# Patient Record
Sex: FEMALE | Race: OTHER | HISPANIC OR LATINO
[De-identification: names, ages, dates, MRNs, and addresses within clinical notes are randomized per-mention and may not be internally consistent; named-entity substitution may affect disease eponyms.]

---

## 2017-06-21 ENCOUNTER — APPOINTMENT (OUTPATIENT)
Dept: RADIOLOGY | Facility: CLINIC | Age: 22
End: 2017-06-21

## 2017-06-21 ENCOUNTER — OUTPATIENT (OUTPATIENT)
Dept: OUTPATIENT SERVICES | Facility: HOSPITAL | Age: 22
LOS: 1 days | End: 2017-06-21
Payer: COMMERCIAL

## 2017-06-21 DIAGNOSIS — Z00.8 ENCOUNTER FOR OTHER GENERAL EXAMINATION: ICD-10-CM

## 2017-06-21 PROCEDURE — 72070 X-RAY EXAM THORAC SPINE 2VWS: CPT

## 2017-06-21 PROCEDURE — 72040 X-RAY EXAM NECK SPINE 2-3 VW: CPT

## 2017-09-28 ENCOUNTER — RESULT REVIEW (OUTPATIENT)
Age: 22
End: 2017-09-28

## 2017-11-26 ENCOUNTER — EMERGENCY (EMERGENCY)
Facility: HOSPITAL | Age: 22
LOS: 0 days | Discharge: HOME | End: 2017-11-26
Attending: EMERGENCY MEDICINE | Admitting: EMERGENCY MEDICINE
Payer: COMMERCIAL

## 2017-11-26 DIAGNOSIS — S90.821A BLISTER (NONTHERMAL), RIGHT FOOT, INITIAL ENCOUNTER: ICD-10-CM
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
## 2017-11-26 DIAGNOSIS — S61.452A OPEN BITE OF LEFT HAND, INITIAL ENCOUNTER: ICD-10-CM
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
## 2017-11-26 PROCEDURE — 99283 EMERGENCY DEPT VISIT LOW MDM: CPT | Mod: 25

## 2017-11-26 RX ORDER — IBUPROFEN 200 MG
1 TABLET ORAL
Qty: 10 | Refills: 0
Start: 2017-11-26

## 2017-11-26 RX ORDER — IBUPROFEN 200 MG
600 TABLET ORAL ONCE
Qty: 0 | Refills: 0 | Status: COMPLETED | OUTPATIENT
Start: 2017-11-26 | End: 2017-11-26

## 2017-11-26 RX ADMIN — Medication 600 MILLIGRAM(S): at 02:45

## 2017-11-26 RX ADMIN — Medication 1 TABLET(S): at 02:45

## 2017-11-26 NOTE — ED PROVIDER NOTE - MEDICAL DECISION MAKING DETAILS
Wound care, antibiotics and outpatient wound check recommended for dog bite.  Wound dressing on right foot for her blister.

## 2017-11-26 NOTE — ED ADULT NURSE NOTE - CHIEF COMPLAINT QUOTE
dog bite by pts own black lab left forearm 30 minutes PTA. as per pt, dogs vaccinations utd. pt unsure of last tetanus

## 2017-11-26 NOTE — ED PROVIDER NOTE - CARE PLAN
Principal Discharge DX:	Dog bite, initial encounter Principal Discharge DX:	Dog bite, initial encounter  Secondary Diagnosis:	Blister of foot, unspecified laterality, initial encounter

## 2017-11-26 NOTE — ED PROVIDER NOTE - OBJECTIVE STATEMENT
Pt. is a 23 yo F BIB Pt. is a 23 yo F presenting with her sister after they were both bit by their black labrador retriever dog they recently adopted.  Pt. was trying to separate dog from biting her sister and then was bit herself on her hand.  She also complains of unrelated blister on top of her right foot for a week.  Pt. UTD on all immunization Pt. is a 21 yo F presenting with her sister after they were both bit by their black labrador retriever dog they recently adopted.  Pt. was trying to separate dog from biting her sister and then was bit herself on her hand.  She also complains of unrelated blister on top of her right foot for a week.  Pt. UTD on all immunizations.  Dog bite occurred approximately 1 hour prior to arrival.

## 2018-08-08 ENCOUNTER — RESULT REVIEW (OUTPATIENT)
Age: 23
End: 2018-08-08

## 2018-08-21 ENCOUNTER — OUTPATIENT (OUTPATIENT)
Dept: OUTPATIENT SERVICES | Facility: HOSPITAL | Age: 23
LOS: 1 days | End: 2018-08-21
Payer: MEDICAID

## 2018-08-21 ENCOUNTER — APPOINTMENT (OUTPATIENT)
Dept: ULTRASOUND IMAGING | Facility: CLINIC | Age: 23
End: 2018-08-21
Payer: MEDICAID

## 2018-08-21 DIAGNOSIS — Z00.8 ENCOUNTER FOR OTHER GENERAL EXAMINATION: ICD-10-CM

## 2018-08-21 PROCEDURE — 76856 US EXAM PELVIC COMPLETE: CPT | Mod: 26

## 2018-08-21 PROCEDURE — 76830 TRANSVAGINAL US NON-OB: CPT

## 2018-08-21 PROCEDURE — 76830 TRANSVAGINAL US NON-OB: CPT | Mod: 26

## 2018-08-21 PROCEDURE — 76856 US EXAM PELVIC COMPLETE: CPT

## 2018-08-23 ENCOUNTER — APPOINTMENT (OUTPATIENT)
Dept: RADIOLOGY | Facility: CLINIC | Age: 23
End: 2018-08-23
Payer: MEDICAID

## 2018-08-23 ENCOUNTER — OUTPATIENT (OUTPATIENT)
Dept: OUTPATIENT SERVICES | Facility: HOSPITAL | Age: 23
LOS: 1 days | End: 2018-08-23
Payer: MEDICAID

## 2018-08-23 DIAGNOSIS — Z00.8 ENCOUNTER FOR OTHER GENERAL EXAMINATION: ICD-10-CM

## 2018-08-23 PROCEDURE — 73630 X-RAY EXAM OF FOOT: CPT

## 2018-08-23 PROCEDURE — 73630 X-RAY EXAM OF FOOT: CPT | Mod: 26,50

## 2019-07-01 ENCOUNTER — EMERGENCY (EMERGENCY)
Facility: HOSPITAL | Age: 24
LOS: 0 days | Discharge: HOME | End: 2019-07-02
Attending: EMERGENCY MEDICINE | Admitting: EMERGENCY MEDICINE
Payer: SELF-PAY

## 2019-07-01 VITALS
TEMPERATURE: 99 F | SYSTOLIC BLOOD PRESSURE: 108 MMHG | OXYGEN SATURATION: 100 % | HEART RATE: 73 BPM | RESPIRATION RATE: 18 BRPM | WEIGHT: 143.96 LBS | DIASTOLIC BLOOD PRESSURE: 78 MMHG | HEIGHT: 64 IN
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
## 2019-07-01 DIAGNOSIS — M54.5 LOW BACK PAIN: ICD-10-CM

## 2019-07-01 DIAGNOSIS — S39.012A STRAIN OF MUSCLE, FASCIA AND TENDON OF LOWER BACK, INITIAL ENCOUNTER: ICD-10-CM
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
## 2019-07-01 PROCEDURE — 99284 EMERGENCY DEPT VISIT MOD MDM: CPT
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
## 2019-07-02 PROCEDURE — 72100 X-RAY EXAM L-S SPINE 2/3 VWS: CPT | Mod: 26

## 2019-07-02 RX ORDER — IBUPROFEN 200 MG
1 TABLET ORAL
Qty: 28 | Refills: 0
Start: 2019-07-02 | End: 2019-07-08

## 2019-07-02 RX ORDER — OXYCODONE AND ACETAMINOPHEN 5; 325 MG/1; MG/1
1 TABLET ORAL ONCE
Refills: 0 | Status: DISCONTINUED | OUTPATIENT
Start: 2019-07-02 | End: 2019-07-02

## 2019-07-02 RX ORDER — KETOROLAC TROMETHAMINE 30 MG/ML
60 SYRINGE (ML) INJECTION ONCE
Refills: 0 | Status: DISCONTINUED | OUTPATIENT
Start: 2019-07-02 | End: 2019-07-02

## 2019-07-02 RX ORDER — DIAZEPAM 5 MG
5 TABLET ORAL ONCE
Refills: 0 | Status: DISCONTINUED | OUTPATIENT
Start: 2019-07-02 | End: 2019-07-02

## 2019-07-02 RX ORDER — DIAZEPAM 5 MG
1 TABLET ORAL
Qty: 20 | Refills: 0
Start: 2019-07-02 | End: 2019-07-06

## 2019-07-02 RX ADMIN — OXYCODONE AND ACETAMINOPHEN 1 TABLET(S): 5; 325 TABLET ORAL at 01:08

## 2019-07-02 RX ADMIN — Medication 60 MILLIGRAM(S): at 00:37

## 2019-07-02 RX ADMIN — Medication 5 MILLIGRAM(S): at 01:08
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
## 2019-07-02 NOTE — ED PROVIDER NOTE - CARE PROVIDER_API CALL
Alexandro John)  Orthopaedic Surgery  [address] [place] [address]  Phone: [phone]  Fax: [phone]  Follow Up Time:

## 2019-07-02 NOTE — ED PROVIDER NOTE - MUSCULOSKELETAL, MLM
Spine appears normal, range of motion is not limited, right lower lumbar and iliac ttp no midline ttp ctls

## 2019-07-04 ENCOUNTER — EMERGENCY (EMERGENCY)
Facility: HOSPITAL | Age: 24
LOS: 0 days | Discharge: HOME | End: 2019-07-04
Attending: EMERGENCY MEDICINE | Admitting: EMERGENCY MEDICINE
Payer: SELF-PAY

## 2019-07-04 VITALS
HEIGHT: 64 IN | HEART RATE: 100 BPM | TEMPERATURE: 99 F | RESPIRATION RATE: 19 BRPM | SYSTOLIC BLOOD PRESSURE: 137 MMHG | DIASTOLIC BLOOD PRESSURE: 99 MMHG | WEIGHT: 143.08 LBS | OXYGEN SATURATION: 100 %

## 2019-07-04 VITALS — DIASTOLIC BLOOD PRESSURE: 64 MMHG | SYSTOLIC BLOOD PRESSURE: 112 MMHG

## 2019-07-04 DIAGNOSIS — R10.9 UNSPECIFIED ABDOMINAL PAIN: ICD-10-CM

## 2019-07-04 DIAGNOSIS — R10.13 EPIGASTRIC PAIN: ICD-10-CM

## 2019-07-04 LAB
ALBUMIN SERPL ELPH-MCNC: 4.1 G/DL — SIGNIFICANT CHANGE UP (ref 3.3–5)
ALP SERPL-CCNC: 64 U/L — SIGNIFICANT CHANGE UP (ref 40–120)
ALT FLD-CCNC: 15 U/L — SIGNIFICANT CHANGE UP (ref 12–78)
ANION GAP SERPL CALC-SCNC: 8 MMOL/L — SIGNIFICANT CHANGE UP (ref 5–17)
APPEARANCE UR: CLEAR — SIGNIFICANT CHANGE UP
AST SERPL-CCNC: 15 U/L — SIGNIFICANT CHANGE UP (ref 15–37)
BACTERIA # UR AUTO: NEGATIVE — SIGNIFICANT CHANGE UP
BASOPHILS # BLD AUTO: 0.04 K/UL — SIGNIFICANT CHANGE UP (ref 0–0.2)
BASOPHILS NFR BLD AUTO: 0.4 % — SIGNIFICANT CHANGE UP (ref 0–2)
BILIRUB SERPL-MCNC: 0.5 MG/DL — SIGNIFICANT CHANGE UP (ref 0.2–1.2)
BILIRUB UR-MCNC: NEGATIVE — SIGNIFICANT CHANGE UP
BUN SERPL-MCNC: 6 MG/DL — LOW (ref 7–23)
CALCIUM SERPL-MCNC: 9.4 MG/DL — SIGNIFICANT CHANGE UP (ref 8.5–10.1)
CHLORIDE SERPL-SCNC: 103 MMOL/L — SIGNIFICANT CHANGE UP (ref 96–108)
CO2 SERPL-SCNC: 27 MMOL/L — SIGNIFICANT CHANGE UP (ref 22–31)
COLOR SPEC: YELLOW — SIGNIFICANT CHANGE UP
COMMENT - URINE: SIGNIFICANT CHANGE UP
CREAT SERPL-MCNC: 0.64 MG/DL — SIGNIFICANT CHANGE UP (ref 0.5–1.3)
DIFF PNL FLD: ABNORMAL
EOSINOPHIL # BLD AUTO: 0.05 K/UL — SIGNIFICANT CHANGE UP (ref 0–0.5)
EOSINOPHIL NFR BLD AUTO: 0.5 % — SIGNIFICANT CHANGE UP (ref 0–6)
EPI CELLS # UR: SIGNIFICANT CHANGE UP
GLUCOSE SERPL-MCNC: 108 MG/DL — HIGH (ref 70–99)
GLUCOSE UR QL: NEGATIVE MG/DL — SIGNIFICANT CHANGE UP
HCT VFR BLD CALC: 38.6 % — SIGNIFICANT CHANGE UP (ref 34.5–45)
HGB BLD-MCNC: 13 G/DL — SIGNIFICANT CHANGE UP (ref 11.5–15.5)
IMM GRANULOCYTES NFR BLD AUTO: 0.2 % — SIGNIFICANT CHANGE UP (ref 0–1.5)
KETONES UR-MCNC: NEGATIVE — SIGNIFICANT CHANGE UP
LEUKOCYTE ESTERASE UR-ACNC: NEGATIVE — SIGNIFICANT CHANGE UP
LIDOCAIN IGE QN: 86 U/L — SIGNIFICANT CHANGE UP (ref 73–393)
LYMPHOCYTES # BLD AUTO: 3.07 K/UL — SIGNIFICANT CHANGE UP (ref 1–3.3)
LYMPHOCYTES # BLD AUTO: 33 % — SIGNIFICANT CHANGE UP (ref 13–44)
MCHC RBC-ENTMCNC: 28.5 PG — SIGNIFICANT CHANGE UP (ref 27–34)
MCHC RBC-ENTMCNC: 33.7 GM/DL — SIGNIFICANT CHANGE UP (ref 32–36)
MCV RBC AUTO: 84.6 FL — SIGNIFICANT CHANGE UP (ref 80–100)
MONOCYTES # BLD AUTO: 0.56 K/UL — SIGNIFICANT CHANGE UP (ref 0–0.9)
MONOCYTES NFR BLD AUTO: 6 % — SIGNIFICANT CHANGE UP (ref 2–14)
NEUTROPHILS # BLD AUTO: 5.56 K/UL — SIGNIFICANT CHANGE UP (ref 1.8–7.4)
NEUTROPHILS NFR BLD AUTO: 59.9 % — SIGNIFICANT CHANGE UP (ref 43–77)
NITRITE UR-MCNC: NEGATIVE — SIGNIFICANT CHANGE UP
PH UR: 7 — SIGNIFICANT CHANGE UP (ref 5–8)
PLATELET # BLD AUTO: 281 K/UL — SIGNIFICANT CHANGE UP (ref 150–400)
POTASSIUM SERPL-MCNC: 3.8 MMOL/L — SIGNIFICANT CHANGE UP (ref 3.5–5.3)
POTASSIUM SERPL-SCNC: 3.8 MMOL/L — SIGNIFICANT CHANGE UP (ref 3.5–5.3)
PROT SERPL-MCNC: 7.7 GM/DL — SIGNIFICANT CHANGE UP (ref 6–8.3)
PROT UR-MCNC: NEGATIVE MG/DL — SIGNIFICANT CHANGE UP
RBC # BLD: 4.56 M/UL — SIGNIFICANT CHANGE UP (ref 3.8–5.2)
RBC # FLD: 13.6 % — SIGNIFICANT CHANGE UP (ref 10.3–14.5)
RBC CASTS # UR COMP ASSIST: SIGNIFICANT CHANGE UP /HPF (ref 0–4)
SODIUM SERPL-SCNC: 138 MMOL/L — SIGNIFICANT CHANGE UP (ref 135–145)
SP GR SPEC: 1.01 — SIGNIFICANT CHANGE UP (ref 1.01–1.02)
UROBILINOGEN FLD QL: NEGATIVE MG/DL — SIGNIFICANT CHANGE UP
WBC # BLD: 9.3 K/UL — SIGNIFICANT CHANGE UP (ref 3.8–10.5)
WBC # FLD AUTO: 9.3 K/UL — SIGNIFICANT CHANGE UP (ref 3.8–10.5)
WBC UR QL: NEGATIVE — SIGNIFICANT CHANGE UP

## 2019-07-04 PROCEDURE — 74177 CT ABD & PELVIS W/CONTRAST: CPT | Mod: 26

## 2019-07-04 PROCEDURE — 93010 ELECTROCARDIOGRAM REPORT: CPT

## 2019-07-04 PROCEDURE — 99284 EMERGENCY DEPT VISIT MOD MDM: CPT

## 2019-07-04 PROCEDURE — 99053 MED SERV 10PM-8AM 24 HR FAC: CPT

## 2019-07-04 RX ORDER — FAMOTIDINE 10 MG/ML
20 INJECTION INTRAVENOUS ONCE
Refills: 0 | Status: COMPLETED | OUTPATIENT
Start: 2019-07-04 | End: 2019-07-04

## 2019-07-04 RX ORDER — ONDANSETRON 8 MG/1
4 TABLET, FILM COATED ORAL ONCE
Refills: 0 | Status: COMPLETED | OUTPATIENT
Start: 2019-07-04 | End: 2019-07-04

## 2019-07-04 RX ORDER — MULTIVIT WITH MIN/MFOLATE/K2 340-15/3 G
1 POWDER (GRAM) ORAL ONCE
Refills: 0 | Status: COMPLETED | OUTPATIENT
Start: 2019-07-04 | End: 2019-07-04

## 2019-07-04 RX ORDER — SODIUM CHLORIDE 9 MG/ML
1000 INJECTION INTRAMUSCULAR; INTRAVENOUS; SUBCUTANEOUS ONCE
Refills: 0 | Status: COMPLETED | OUTPATIENT
Start: 2019-07-04 | End: 2019-07-04

## 2019-07-04 RX ADMIN — FAMOTIDINE 20 MILLIGRAM(S): 10 INJECTION INTRAVENOUS at 04:22

## 2019-07-04 RX ADMIN — SODIUM CHLORIDE 1000 MILLILITER(S): 9 INJECTION INTRAMUSCULAR; INTRAVENOUS; SUBCUTANEOUS at 05:21

## 2019-07-04 RX ADMIN — SODIUM CHLORIDE 1000 MILLILITER(S): 9 INJECTION INTRAMUSCULAR; INTRAVENOUS; SUBCUTANEOUS at 04:21

## 2019-07-04 RX ADMIN — Medication 1 BOTTLE: at 05:56

## 2019-07-04 RX ADMIN — ONDANSETRON 4 MILLIGRAM(S): 8 TABLET, FILM COATED ORAL at 04:22

## 2019-07-04 NOTE — ED PROVIDER NOTE - NSFOLLOWUPINSTRUCTIONS_ED_ALL_ED_FT
Follow up with your doctor at the [facility] on Monday  Stay well hydrated  Return to ED for any worsening symptoms    Abdominal Pain    Many things can cause abdominal pain. Many times, abdominal pain is not caused by a disease and will improve without treatment. Your health care provider will do a physical exam to determine if there is a dangerous cause of your pain; blood tests and imaging may help determine the cause of your pain. However, in many cases, no cause may be found and you may need further testing as an outpatient. Monitor your abdominal pain for any changes.     SEEK IMMEDIATE MEDICAL CARE IF YOU HAVE ANY OF THE FOLLOWING SYMPTOMS: worsening abdominal pain, uncontrollable vomiting, profuse diarrhea, inability to have bowel movements or pass gas, black or bloody stools, fever accompanying chest pain or back pain, or fainting. These symptoms may represent a serious problem that is an emergency. Do not wait to see if the symptoms will go away. Get medical help right away. Call 911 and do not drive yourself to the hospital.       Constipation    Constipation is when a person has fewer than three bowel movements a week, has difficulty having a bowel movement, or has stools that are dry, hard, or larger than normal. Other symptoms can include abdominal pain or bloating. As people grow older, constipation is more common. A low-fiber diet, not taking in enough fluids, and taking certain medicines, including opioid painkillers, may make constipation worse. Treatment varies but may include dietary modifications (more fiber-rich foods), lifestyle modifications, and possible medications.     SEEK IMMEDIATE MEDICAL CARE IF YOU HAVE ANY OF THE FOLLOWING SYMPTOMS: bright red blood in your stool, constipation for longer than 4 days, abdominal or rectal pain, unexplained weight loss, or inability to pass gas.

## 2019-07-04 NOTE — ED ADULT TRIAGE NOTE - CHIEF COMPLAINT QUOTE
patient c/o worsening sharp constant chest pain and generalized abdominal pain for past day, unable to sleep from pain. nauseous. denies vomiting or diarrhea.

## 2019-07-04 NOTE — ED ADULT NURSE NOTE - NSIMPLEMENTINTERV_GEN_ALL_ED
Implemented All Fall Risk Interventions:  [place] to call system. Call bell, personal items and telephone within reach. Instruct patient to call for assistance. Room bathroom lighting operational. Non-slip footwear when patient is off stretcher. Physically safe environment: no spills, clutter or unnecessary equipment. Stretcher in lowest position, wheels locked, appropriate side rails in place. Provide visual cue, wrist band, yellow gown, etc. Monitor gait and stability. Monitor for mental status changes and reorient to person, place, and time. Review medications for side effects contributing to fall risk. Reinforce activity limits and safety measures with patient and family.
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
## 2019-10-06 ENCOUNTER — EMERGENCY (EMERGENCY)
Facility: HOSPITAL | Age: 24
LOS: 0 days | Discharge: HOME | End: 2019-10-06
Attending: EMERGENCY MEDICINE
Payer: SELF-PAY

## 2019-10-06 VITALS — HEIGHT: 64 IN | WEIGHT: 130.07 LBS

## 2019-10-06 VITALS
OXYGEN SATURATION: 100 % | DIASTOLIC BLOOD PRESSURE: 62 MMHG | SYSTOLIC BLOOD PRESSURE: 106 MMHG | RESPIRATION RATE: 18 BRPM | TEMPERATURE: 98 F | HEART RATE: 64 BPM

## 2019-10-06 DIAGNOSIS — L03.011 CELLULITIS OF RIGHT FINGER: ICD-10-CM

## 2019-10-06 DIAGNOSIS — M79.644 PAIN IN RIGHT FINGER(S): ICD-10-CM

## 2019-10-06 PROCEDURE — 99283 EMERGENCY DEPT VISIT LOW MDM: CPT | Mod: 25

## 2019-10-06 PROCEDURE — 26010 DRAINAGE OF FINGER ABSCESS: CPT

## 2019-10-06 PROCEDURE — 87070 CULTURE OTHR SPECIMN AEROBIC: CPT

## 2019-10-06 PROCEDURE — 81025 URINE PREGNANCY TEST: CPT

## 2019-10-06 PROCEDURE — 87186 SC STD MICRODIL/AGAR DIL: CPT

## 2019-10-06 PROCEDURE — 99283 EMERGENCY DEPT VISIT LOW MDM: CPT

## 2019-10-06 PROCEDURE — 26010 DRAINAGE OF FINGER ABSCESS: CPT | Mod: F7

## 2019-10-06 RX ORDER — IBUPROFEN 200 MG
600 TABLET ORAL ONCE
Refills: 0 | Status: COMPLETED | OUTPATIENT
Start: 2019-10-06 | End: 2019-10-06

## 2019-10-06 RX ORDER — AZTREONAM 2 G
1 VIAL (EA) INJECTION
Qty: 14 | Refills: 0
Start: 2019-10-06 | End: 2019-10-12

## 2019-10-06 RX ADMIN — Medication 100 MILLIGRAM(S): at 21:33

## 2019-10-06 RX ADMIN — Medication 600 MILLIGRAM(S): at 21:00

## 2019-10-06 NOTE — ED STATDOCS - OBJECTIVE STATEMENT
24 yr old F with no PMHx presents to ED with finger pain. +erythema patient reports that she started to have middle finger pain and swelling that has worsened over the past 2 days. Patient has no further complaints at this time.

## 2019-10-06 NOTE — ED STATDOCS - ATTENDING CONTRIBUTION TO CARE
Attending Contribution to Care: I, Sia Nuno, performed the initial face to face bedside interview with this patient regarding history of present illness, review of symptoms and relevant past medical, social and family history.  I completed an independent physical examination.  I was the initial provider who evaluated this patient and the history, physical, and MDM reflect this intial assessment. I have signed out the follow up of any pending tests after the original (i.e. labs, radiological studies) to the ACP with instructions to review any with instructions to review any concerning findings to me prior to discharge.  I have communicated the patient’s plan of care and disposition with the ACP.

## 2019-10-06 NOTE — ED STATDOCS - PROGRESS NOTE DETAILS
signed Aida Gallegos PA-C Pt seen initially in intake by Dr. Kaba.   24F c/o right 3rd digit fingertip swelling and pain x 3 days after she pulled off a piece of her cuticle. Denies fever. + paronychia right 3rd fingertip. Incised, small purulence express, cx sent, start abx doxy. return precautions given. Pt feeling well at DC, agrees with DC and plan of care.

## 2019-10-06 NOTE — ED POST DISCHARGE NOTE - RESULT SUMMARY
Pt called, she vomited after DC. Will change doxy to bactrim, start bactrim instead. Pt agrees with plan of  care. signed Aida Gallegos PA-C Pt called, she vomited after DC. Will change doxy to bactrim, start bactrim instead. Pt agrees with plan of care. signed Aida Gallegos PA-C

## 2019-10-06 NOTE — ED STATDOCS - SKIN, MLM
skin normal color for race, warm, dry and intact. +R 3rd digit swelling and any erythema with fluctuance, no drainage

## 2019-10-06 NOTE — ED ADULT NURSE NOTE - NSIMPLEMENTINTERV_GEN_ALL_ED
Implemented All Universal Safety Interventions:  [place] to call system. Call bell, personal items and telephone within reach. Instruct patient to call for assistance. Room bathroom lighting operational. Non-slip footwear when patient is off stretcher. Physically safe environment: no spills, clutter or unnecessary equipment. Stretcher in lowest position, wheels locked, appropriate side rails in place.

## 2019-10-06 NOTE — ED ADULT NURSE NOTE - OBJECTIVE STATEMENT
pt to ed for right hand finger pain. Redness and edema noted to finger. Complains of 9/10 pain. Denies numbness and tingling.

## 2019-10-06 NOTE — ED POST DISCHARGE NOTE - DETAILS
Culture + for Staph aureus, currently on bactrim to which bacteria is sensitive. - Damaso Tello PA-C

## 2019-10-06 NOTE — ED STATDOCS - PATIENT PORTAL LINK FT
You can access the FollowMyHealth Patient Portal offered by [facility] by registering at the following website: [email or web address]. By joining [facility]’s FollowMyHealth portal, you will also be able to view your health information using other applications (apps) compatible with our system.

## 2019-10-09 LAB
-  AMPICILLIN/SULBACTAM: SIGNIFICANT CHANGE UP
-  CEFAZOLIN: SIGNIFICANT CHANGE UP
-  CLINDAMYCIN: SIGNIFICANT CHANGE UP
-  ERYTHROMYCIN: SIGNIFICANT CHANGE UP
-  GENTAMICIN: SIGNIFICANT CHANGE UP
-  OXACILLIN: SIGNIFICANT CHANGE UP
-  RIFAMPIN: SIGNIFICANT CHANGE UP
-  TETRACYCLINE: SIGNIFICANT CHANGE UP
-  TRIMETHOPRIM/SULFAMETHOXAZOLE: SIGNIFICANT CHANGE UP
-  VANCOMYCIN: SIGNIFICANT CHANGE UP
CULTURE RESULTS: SIGNIFICANT CHANGE UP
METHOD TYPE: SIGNIFICANT CHANGE UP
ORGANISM # SPEC MICROSCOPIC CNT: SIGNIFICANT CHANGE UP
ORGANISM # SPEC MICROSCOPIC CNT: SIGNIFICANT CHANGE UP
SPECIMEN SOURCE: SIGNIFICANT CHANGE UP

## 2020-08-07 ENCOUNTER — TRANSCRIPTION ENCOUNTER (OUTPATIENT)
Age: 25
End: 2020-08-07

## 2020-08-14 ENCOUNTER — APPOINTMENT (OUTPATIENT)
Dept: ORTHOPEDIC SURGERY | Facility: CLINIC | Age: 25
End: 2020-08-14
Payer: MEDICAID

## 2020-08-14 VITALS
SYSTOLIC BLOOD PRESSURE: 113 MMHG | HEART RATE: 69 BPM | BODY MASS INDEX: 23.32 KG/M2 | WEIGHT: 140 LBS | HEIGHT: 65 IN | DIASTOLIC BLOOD PRESSURE: 73 MMHG

## 2020-08-14 PROCEDURE — 99204 OFFICE O/P NEW MOD 45 MIN: CPT

## 2020-08-14 NOTE — HISTORY OF PRESENT ILLNESS
[FreeTextEntry1] : 25 year old female presenting with left ankle pain. The patient’s pain is noted to be a 5/10. The patient's pain began 2.5 weeks ago when she injured her ankle while walking. The patient describes their pain as constant, throbbing, and sharp. The pain is made worse by walking. She presents in the office with an ankle brace. She is currently taking no pain medication. No other complaints at this time.

## 2020-08-14 NOTE — ADDENDUM
[FreeTextEntry1] : I, Myron Lawrence, acted solely as a scribe for Dr. Michael Caicedo on this date 08/14/2020 .\par All medical record entries made by the Scribe were at my, Dr. Michael Caicedo, direction and personally dictated by me on 08/14/2020 . I have reviewed the chart and agree that the record accurately reflects my personal performance of the history, physical exam, assessment and plan. I have also personally directed, reviewed, and agreed with the chart.

## 2020-08-14 NOTE — DISCUSSION/SUMMARY
[de-identified] : Today I had a lengthy discussion with the patient regarding their left ankle pain. I have addressed all the patient's concerns surrounding the pathology of their condition. I recommend the patient undergo a course of physical therapy for the left ankle 2-3 times a week for a total of 6-8 weeks. A prescription was given for the physical therapy today. I recommend that the patient utilize ice, NSAIDS PRN, and heat. She can also elevate the left ankle above the level of the heart. I would like to see the patient back in the office PRN to reassess their condition. The patient understood and verbally agreed to the treatment plan. All of their questions were answered and they were satisfied with the visit. The patient should call the office if they have any questions or experience worsening symptoms.

## 2020-08-14 NOTE — PHYSICAL EXAM
[de-identified] : General: Alert and oriented x3. In no acute distress. Pleasant in nature with a normal affect. No apparent respiratory distress.\par \par L Ankle Exam\par Skin: Clean, dry, intact\par Inspection: No obvious malalignment, no swelling, no effusion; no lymphadenopathy\par Pulses: 2+ DP/PT pulses\par ROM: L Ankle 10 degrees of dorsiflexion, 40 degrees of plantarflexion, 10 degrees of subtalar motion\par Tenderness: +tenderness anterolateral gutter, +ATFL. No tenderness over the lateral malleolus, no CFL//PTFL pain. No medial malleolus pain, no deltoid ligament pain. No proximal fibular pain. No heel pain.\par Stability: Negative anterior/posterior drawer.\par Strength: 5/5 TA/GS/EHL\par Neuro: In tact to light touch throughout\par Additional tests: Negative Mortons test, Negative syndesmosis squeeze test.  [de-identified] : X-rays of the left ankle obtained from outside facility and reviewed by me today 08/14/2020. Revealed: No acute fracture.

## 2020-09-14 ENCOUNTER — APPOINTMENT (OUTPATIENT)
Dept: ORTHOPEDIC SURGERY | Facility: CLINIC | Age: 25
End: 2020-09-14
Payer: MEDICAID

## 2020-09-14 PROCEDURE — 99213 OFFICE O/P EST LOW 20 MIN: CPT

## 2020-09-14 NOTE — PHYSICAL EXAM
[de-identified] : General: Alert and oriented x3. In no acute distress. Pleasant in nature with a normal affect. No apparent respiratory distress.\par \par L Ankle Exam\par Skin: Clean, dry, intact\par Inspection: No obvious malalignment, no swelling, no effusion; no lymphadenopathy\par Pulses: 2+ DP/PT pulses\par ROM: L Ankle 10 degrees of dorsiflexion, 40 degrees of plantarflexion, 10 degrees of subtalar motion\par Tenderness: +tenderness anterolateral gutter, +ATFL. No tenderness over the lateral malleolus, no CFL//PTFL pain. No medial malleolus pain, no deltoid ligament pain. No proximal fibular pain. No heel pain.\par Stability: Negative anterior/posterior drawer.\par Strength: 5/5 TA/GS/EHL\par Neuro: In tact to light touch throughout\par Additional tests: Negative Mortons test, Negative syndesmosis squeeze test.  [de-identified] : X-rays of the left ankle obtained, Revealed: No acute fracture.

## 2020-09-14 NOTE — DISCUSSION/SUMMARY
[de-identified] : Assessment: Continued left ankle pain.\par \par Plan:\par At this point I would like an MRI of the left ankle to evaluate for a possible talus injury which is suspected on x-ray. I gave her a long cam boot to use instead of the brace for more protection. Patient will continue with anti-inflammatories and over-the-counter pain medications. She will continue with ice and heat therapy. The patient will followup post MRI to review and discuss the findings. She understood and agreed with treatment plan.

## 2020-09-14 NOTE — HISTORY OF PRESENT ILLNESS
[FreeTextEntry1] : 09/14/20: The patient returns with increased left ankle pain. She injured her ankle at the end of July and it has not gotten better since the injury. She presents wearing an ankle brace today. Her pain scale is 7/10. She denies locking and catching of the ankle. She has no numbness or tingling going down the left lower extremity. Since her last visit patient has tried a six-week home exercise and stretching program. She has been on anti-inflammatories since the injury at the end of July. No relief with conservative measures. No other complaints.\par \par 08/14/20: 25 year old female presenting with left ankle pain. The patient’s pain is noted to be a 5/10. The patient's pain began 2.5 weeks ago when she injured her ankle while walking. The patient describes their pain as constant, throbbing, and sharp. The pain is made worse by walking. She presents in the office with an ankle brace. She is currently taking no pain medication. No other complaints at this time.

## 2020-09-22 ENCOUNTER — OUTPATIENT (OUTPATIENT)
Dept: OUTPATIENT SERVICES | Facility: HOSPITAL | Age: 25
LOS: 1 days | End: 2020-09-22
Payer: MEDICAID

## 2020-09-22 ENCOUNTER — APPOINTMENT (OUTPATIENT)
Dept: MRI IMAGING | Facility: CLINIC | Age: 25
End: 2020-09-22
Payer: MEDICAID

## 2020-09-22 DIAGNOSIS — M25.572 PAIN IN LEFT ANKLE AND JOINTS OF LEFT FOOT: ICD-10-CM

## 2020-09-22 DIAGNOSIS — S99.912A UNSPECIFIED INJURY OF LEFT ANKLE, INITIAL ENCOUNTER: ICD-10-CM

## 2020-09-22 PROCEDURE — 73721 MRI JNT OF LWR EXTRE W/O DYE: CPT | Mod: 26,LT

## 2020-09-22 PROCEDURE — 73721 MRI JNT OF LWR EXTRE W/O DYE: CPT

## 2020-10-05 ENCOUNTER — APPOINTMENT (OUTPATIENT)
Dept: ORTHOPEDIC SURGERY | Facility: CLINIC | Age: 25
End: 2020-10-05
Payer: MEDICAID

## 2020-10-05 DIAGNOSIS — Z78.9 OTHER SPECIFIED HEALTH STATUS: ICD-10-CM

## 2020-10-05 DIAGNOSIS — Z72.3 LACK OF PHYSICAL EXERCISE: ICD-10-CM

## 2020-10-05 PROCEDURE — 99214 OFFICE O/P EST MOD 30 MIN: CPT

## 2020-10-05 RX ORDER — VALACYCLOVIR 1 G/1
1 TABLET, FILM COATED ORAL
Qty: 20 | Refills: 0 | Status: ACTIVE | COMMUNITY
Start: 2020-06-19

## 2020-10-05 NOTE — ADDENDUM
[FreeTextEntry1] : I, Myron Lawrence, acted solely as a scribe for Dr. Michael Caicedo on this date 10/05/2020 .\par All medical record entries made by the Scribe were at my, Dr. Michael Caicedo, direction and personally dictated by me on 10/05/2020 . I have reviewed the chart and agree that the record accurately reflects my personal performance of the history, physical exam, assessment and plan. I have also personally directed, reviewed, and agreed with the chart.

## 2020-10-05 NOTE — DISCUSSION/SUMMARY
[de-identified] : Today I had a lengthy discussion with the patient regarding their left ankle pain. I have addressed all the patient's concerns surrounding the pathology of their condition. The MRI results were reviewed with the patient today. A discussion was had about a left ankle arthroscopy surgery. A lengthy discussion was had about the surgery. All risks, benefits and alternatives to the recommended surgical procedure were discussed which include but are not limited to bleeding, infection, nerve damage, vascular damage, failure of the wound to heal, the need for further surgery, loss of limb, DVT, PE, loss of life as well as the risks associated with general anesthesia. The patient verbalized understanding and provided informed consent to move forward with surgery. The patient understood and verbally agreed to the treatment plan. All of their questions were answered and they were satisfied with the visit. The patient should call the office if they have any questions or experience worsening symptoms.

## 2020-10-05 NOTE — PHYSICAL EXAM
[de-identified] : General: Alert and oriented x3. In no acute distress. Pleasant in nature with a normal affect. No apparent respiratory distress.\par \par L Ankle Exam\par Skin: Clean, dry, intact\par Inspection: No obvious malalignment, no swelling, no effusion; no lymphadenopathy\par Pulses: 2+ DP/PT pulses\par ROM: L Ankle 10 degrees of dorsiflexion, 40 degrees of plantarflexion, 10 degrees of subtalar motion\par Tenderness: +tenderness anterolateral gutter, +ATFL. No tenderness over the lateral malleolus, no CFL//PTFL pain. No medial malleolus pain, no deltoid ligament pain. No proximal fibular pain. No heel pain.\par Stability: Negative anterior/posterior drawer.\par Strength: 5/5 TA/GS/EHL\par Neuro: In tact to light touch throughout\par Additional tests: Negative Mortons test, Negative syndesmosis squeeze test.  [de-identified] : An MRI was obtained of the left ankle from an outside facility on 9/22/2020 and reviewed by me today 10/05/2020  in the office. Revealed: \par 1. Subacute ankle sprain with scar remodeling of the ATFL, CFL, and anterior syndesmotic ligaments. \par 2. Small osteochondral injury at the lateral talar dome. \par 3. Small ankle joint effusion. \par 4. Mild peroneal tenosynovitis.

## 2020-10-05 NOTE — HISTORY OF PRESENT ILLNESS
[FreeTextEntry1] : 25 year old female presenting with left ankle pain. The patient’s pain is noted to be a 4/10. The pain and swelling are noted to be the same compared to the previous visit. The patient presents wearing a CAM boot. She reports having tightness in the ankle. She presents today to review her MRI results. She is currently taking no pain medication. She has been utilizing ice and heat as well. No other complaints at this time.

## 2020-10-14 NOTE — ED ADULT NURSE NOTE - GASTROINTESTINAL ASSESSMENT
[Fully active, able to carry on all pre-disease performance without restriction] : Status 0 - Fully active, able to carry on all pre-disease performance without restriction [Normal] : grossly intact [Obese] : obese [de-identified] : obese [de-identified] : anxious  WDL

## 2020-10-17 ENCOUNTER — OUTPATIENT (OUTPATIENT)
Dept: OUTPATIENT SERVICES | Facility: HOSPITAL | Age: 25
LOS: 1 days | End: 2020-10-17
Payer: MEDICAID

## 2020-10-17 DIAGNOSIS — Z11.59 ENCOUNTER FOR SCREENING FOR OTHER VIRAL DISEASES: ICD-10-CM

## 2020-10-17 DIAGNOSIS — M25.572 PAIN IN LEFT ANKLE AND JOINTS OF LEFT FOOT: ICD-10-CM

## 2020-10-17 LAB — SARS-COV-2 RNA SPEC QL NAA+PROBE: SIGNIFICANT CHANGE UP

## 2020-10-17 PROCEDURE — U0003: CPT

## 2020-10-18 DIAGNOSIS — Z11.59 ENCOUNTER FOR SCREENING FOR OTHER VIRAL DISEASES: ICD-10-CM

## 2020-10-18 DIAGNOSIS — M25.572 PAIN IN LEFT ANKLE AND JOINTS OF LEFT FOOT: ICD-10-CM

## 2020-10-19 NOTE — ASU PATIENT PROFILE, ADULT - PMH
Ankle injury  left  Ankle pain  left  No pertinent past medical history    Osteochondral talar dome lesion

## 2020-10-19 NOTE — ASU PATIENT PROFILE, ADULT - PRO INTERPRETER NEED 2
----- Message from Teresa Martin sent at 1/16/2019 10:00 AM CST -----  Contact: Tawny vo/Cathy  Patient Returning Call from [facility]    Who Left Message for Patient: Nova bustos  Communication Preference: [phone]  Additional Information:  
Spoken to Tawny at pt's dialysis unit. I informed her that pt haven't completed any of the 2nd part of his evaluation. A 30 day letter was sent in 11/18 but pt never responded. She will contact pt to arrange for his appointments within the next two weeks. I informed her that if pt doensn't arrange for tests, his chart will be closed.   
English

## 2020-10-20 ENCOUNTER — OUTPATIENT (OUTPATIENT)
Dept: INPATIENT UNIT | Facility: HOSPITAL | Age: 25
LOS: 1 days | Discharge: HOME | End: 2020-10-20
Payer: MEDICAID

## 2020-10-20 ENCOUNTER — APPOINTMENT (OUTPATIENT)
Dept: ORTHOPEDIC SURGERY | Facility: HOSPITAL | Age: 25
End: 2020-10-20

## 2020-10-20 VITALS
WEIGHT: 157.41 LBS | OXYGEN SATURATION: 100 % | DIASTOLIC BLOOD PRESSURE: 66 MMHG | TEMPERATURE: 98 F | RESPIRATION RATE: 15 BRPM | HEART RATE: 73 BPM | SYSTOLIC BLOOD PRESSURE: 107 MMHG | HEIGHT: 64 IN

## 2020-10-20 VITALS
DIASTOLIC BLOOD PRESSURE: 59 MMHG | RESPIRATION RATE: 16 BRPM | SYSTOLIC BLOOD PRESSURE: 97 MMHG | TEMPERATURE: 97 F | HEART RATE: 80 BPM | OXYGEN SATURATION: 100 %

## 2020-10-20 DIAGNOSIS — S99.912A UNSPECIFIED INJURY OF LEFT ANKLE, INITIAL ENCOUNTER: ICD-10-CM

## 2020-10-20 DIAGNOSIS — M25.572 PAIN IN LEFT ANKLE AND JOINTS OF LEFT FOOT: ICD-10-CM

## 2020-10-20 LAB — HCG UR QL: NEGATIVE — SIGNIFICANT CHANGE UP

## 2020-10-20 PROCEDURE — 27698 REPAIR OF ANKLE LIGAMENT: CPT | Mod: LT

## 2020-10-20 PROCEDURE — C1713: CPT

## 2020-10-20 PROCEDURE — 81025 URINE PREGNANCY TEST: CPT

## 2020-10-20 PROCEDURE — 29898 ANKLE ARTHROSCOPY/SURGERY: CPT | Mod: LT

## 2020-10-20 RX ORDER — ONDANSETRON 8 MG/1
1 TABLET, FILM COATED ORAL
Qty: 15 | Refills: 0
Start: 2020-10-20 | End: 2020-10-24

## 2020-10-20 RX ORDER — ASPIRIN/CALCIUM CARB/MAGNESIUM 324 MG
1 TABLET ORAL
Qty: 30 | Refills: 0
Start: 2020-10-20 | End: 2020-11-18

## 2020-10-20 RX ORDER — DOCUSATE SODIUM 100 MG
1 CAPSULE ORAL
Qty: 40 | Refills: 0
Start: 2020-10-20

## 2020-10-20 RX ORDER — OXYCODONE HYDROCHLORIDE 5 MG/1
10 TABLET ORAL ONCE
Refills: 0 | Status: DISCONTINUED | OUTPATIENT
Start: 2020-10-20 | End: 2020-10-20

## 2020-10-20 RX ORDER — PROCHLORPERAZINE MALEATE 5 MG
10 TABLET ORAL ONCE
Refills: 0 | Status: COMPLETED | OUTPATIENT
Start: 2020-10-20 | End: 2020-10-20

## 2020-10-20 RX ORDER — OXYCODONE HYDROCHLORIDE 5 MG/1
1 TABLET ORAL
Qty: 20 | Refills: 0
Start: 2020-10-20 | End: 2020-10-24

## 2020-10-20 RX ORDER — DOCUSATE SODIUM 100 MG
1 CAPSULE ORAL
Qty: 21 | Refills: 0
Start: 2020-10-20 | End: 2020-10-26

## 2020-10-20 RX ORDER — FENTANYL CITRATE 50 UG/ML
50 INJECTION INTRAVENOUS
Refills: 0 | Status: DISCONTINUED | OUTPATIENT
Start: 2020-10-20 | End: 2020-10-20

## 2020-10-20 RX ORDER — ONDANSETRON 8 MG/1
1 TABLET, FILM COATED ORAL
Qty: 15 | Refills: 0
Start: 2020-10-20

## 2020-10-20 RX ORDER — OXYCODONE HYDROCHLORIDE 5 MG/1
5 TABLET ORAL ONCE
Refills: 0 | Status: DISCONTINUED | OUTPATIENT
Start: 2020-10-20 | End: 2020-10-20

## 2020-10-20 RX ORDER — OXYCODONE AND ACETAMINOPHEN 5; 325 MG/1; MG/1
1 TABLET ORAL
Qty: 28 | Refills: 0
Start: 2020-10-20 | End: 2020-10-26

## 2020-10-20 RX ORDER — SODIUM CHLORIDE 9 MG/ML
1000 INJECTION, SOLUTION INTRAVENOUS
Refills: 0 | Status: DISCONTINUED | OUTPATIENT
Start: 2020-10-20 | End: 2020-10-20

## 2020-10-20 RX ORDER — ONDANSETRON 8 MG/1
4 TABLET, FILM COATED ORAL ONCE
Refills: 0 | Status: COMPLETED | OUTPATIENT
Start: 2020-10-20 | End: 2020-10-20

## 2020-10-20 RX ADMIN — Medication 10 MILLIGRAM(S): at 16:24

## 2020-10-20 RX ADMIN — ONDANSETRON 4 MILLIGRAM(S): 8 TABLET, FILM COATED ORAL at 15:45

## 2020-10-20 RX ADMIN — SODIUM CHLORIDE 125 MILLILITER(S): 9 INJECTION, SOLUTION INTRAVENOUS at 15:46

## 2020-10-20 RX ADMIN — FENTANYL CITRATE 50 MICROGRAM(S): 50 INJECTION INTRAVENOUS at 16:03

## 2020-10-20 RX ADMIN — FENTANYL CITRATE 50 MICROGRAM(S): 50 INJECTION INTRAVENOUS at 16:33

## 2020-10-20 NOTE — BRIEF OPERATIVE NOTE - OPERATION/FINDINGS
Left ankle arthroscopy with repair of lateral collateral ankle ligaments performed intra-operatively. See operative report for complete details.

## 2020-10-20 NOTE — BRIEF OPERATIVE NOTE - NSICDXBRIEFPOSTOP_GEN_ALL_CORE_FT
POST-OP DIAGNOSIS:  Osteochondral talar dome lesion 20-Oct-2020 15:37:46 Left ankle Genoveva Hartmann  Ankle instability, left 20-Oct-2020 15:37:52  Genoveva Hartmann

## 2020-10-20 NOTE — ASU DISCHARGE PLAN (ADULT/PEDIATRIC) - CARE PROVIDER_API CALL
Michael Caicedo  ORTHOPAEDIC SURGERY  [address] [place] [address]  Phone: [phone]  Fax: [phone]  Follow Up Time:

## 2020-10-20 NOTE — BRIEF OPERATIVE NOTE - NSICDXBRIEFPREOP_GEN_ALL_CORE_FT
PRE-OP DIAGNOSIS:  Osteochondral talar dome lesion 20-Oct-2020 15:37:34 Left ankle Genoveva Hartmann  Ankle instability, left 20-Oct-2020 15:37:22  Genoveva Hartmann

## 2020-10-20 NOTE — ASU DISCHARGE PLAN (ADULT/PEDIATRIC) - ASU DC SPECIAL INSTRUCTIONSFT
Post-Operative Instructions    PRESCRIPTIONS: your post-operative medications have been handed to you or sent to the pharmacy you indicated at your pre-operative visit.  If you have any difficulty obtaining your post-operative medications or have any questions, please call the office at [phone].      PAIN MANAGEMENT: You should expect to have discomfort for the first week or so after surgery. Pain medication should be taken to help alleviate the pain so that you are comfortable and can participate in physical therapy.  Take the medication as directed.  You may decrease the amount of pain medication, as tolerated, when pain improves.  You must exercise caution when operating a motor vehicle. You have been prescribed one or more of the following as indicated on your Med Rec form thta the Nurse will go over with you:  [ X] Oxycodone 5mg 1-2 tablets by mouth every 4 to 6 hours as needed for pain  Oxycodone is a short-acting pain medication routinely prescribed after surgery.  It is the pain medication found in “Percocet,” which is a combination of oxycodone and Tylenol.  If you were prescribed oxycodone, you may take Tylenol in addition to this medication, if needed for pain control.  [  ] Oxycontin 10mg by mouth every 12 hours for 5 days  Oxycontin is a long-acting pain medication.  It is sometimes prescribed after longer surgeries. If you were prescribed this medication, you should take it twice a day for the first 5 days after surgery to help with pain control.  [  ] Vicodin or Norco (Hydrocodone 5mg/Tylenol 325mg) 1-2 tablets by mouth every 4-6 hours as needed for pain  Vicodin and Norco are short acting pain medications sometimes prescribed after surgery.  If you were prescribed this medication, you may take 1-2 tablets every 4-6 hours as needed for pain.  This medication already contains Tylenol.  You should NOT take any additional Tylenol (acetaminophen) if you are taking these medications.    NAUSEA: Nausea is a common side effect of anesthesia and pain medications.  You may take the below medication if you are experiencing nausea after surgery.  If you continue to experience nausea or vomiting more than 24 hours after surgery, please call the office.  [X] Ondansetron 4mg by mouth every 4 hours as needed for nausea    CONSTIPATION: common side effect of anesthesia and pain medications.  You should take Colace three times daily, as long as you are taking narcotic pain medications after surgery, such as oxycodone, oxycontin, vicodin, or norco.  [ X ] Colace 100mg by mouth three times daily    DVT PROPHYLAXIS (PREVENTION OF BLOOD CLOTS): Aspirin EC can reduce the risk of blood clots after surgery, particularly after surgery on the legs, ankles and feet.  If you have been prescribed Aspirn, it is essential that you take this medication.  If you already are on an anticoagulant (blood thinner) such as Xarelto, Coumadin, Warfarin, Eliquis - you should resume you home "blood-thinner" in place of the Aspirn.  [ X] Aspirin 81mg ENTERIC COATED (EC) by mouth once daily for 2-4 weeks (depending on surgical procedure)    ACTIVITY: You should be up and moving as much and as often as possible! Do NOT walk or put bodyweight on your splint or surgical side. Use Crutches or walker. You must keep your bandage/splint dry. Do NOT get it wet. IF you shower it MUST be covered tightly with a garbage bag. Otherwise sponge bath is advised. You do NOT want wet bandages on your skin as they can cause skin breakdown under the splint.    BANDAGE: Your bandage will be chaged in the office. Do NOT remove it yourself. IF it gets damaged or wet (soaked) call. Follow up about 7-10 days in office or as otherwise advised by Dr. Caicedo if different. Follow up with Dr Caicedo in 10-14 days. Call office for appointment. Take medications as prescribed. Keep dressing clean, dry, and intact. Rest, ice, and elevate affected extremity.  Left lower extremity non weightbearing with assistive devices.

## 2020-10-20 NOTE — BRIEF OPERATIVE NOTE - NSICDXBRIEFPROCEDURE_GEN_ALL_CORE_FT
PROCEDURES:  Repair of collateral ligament of left ankle 20-Oct-2020 15:39:44 Repair of left lateral collateral ankle ligaments Genoveva Hartmann  Arthroscopy of left ankle with stabilization 20-Oct-2020 15:38:18  Genoveva Hartmann

## 2020-10-20 NOTE — ASU DISCHARGE PLAN (ADULT/PEDIATRIC) - PROCEDURE
Left Ankle Arthroscopy/Lateral Ligament Stabalization/Osteochondral Lesion Repair Left Ankle Arthroscopic debridement with lateral Ligament Stabilization

## 2020-10-21 RX ORDER — ASPIRIN 325 MG/1
325 TABLET, FILM COATED ORAL
Qty: 30 | Refills: 1 | Status: ACTIVE | COMMUNITY
Start: 2020-10-21 | End: 1900-01-01

## 2020-10-26 DIAGNOSIS — M65.872 OTHER SYNOVITIS AND TENOSYNOVITIS, LEFT ANKLE AND FOOT: ICD-10-CM

## 2020-10-26 DIAGNOSIS — M24.272 DISORDER OF LIGAMENT, LEFT ANKLE: ICD-10-CM

## 2020-10-26 DIAGNOSIS — M93.272 OSTEOCHONDRITIS DISSECANS, LEFT ANKLE AND JOINTS OF LEFT FOOT: ICD-10-CM

## 2020-11-04 ENCOUNTER — APPOINTMENT (OUTPATIENT)
Dept: ORTHOPEDIC SURGERY | Facility: CLINIC | Age: 25
End: 2020-11-04
Payer: MEDICAID

## 2020-11-04 PROBLEM — S99.919A UNSPECIFIED INJURY OF UNSPECIFIED ANKLE, INITIAL ENCOUNTER: Chronic | Status: ACTIVE | Noted: 2020-10-19

## 2020-11-04 PROBLEM — M89.9 DISORDER OF BONE, UNSPECIFIED: Chronic | Status: ACTIVE | Noted: 2020-10-19

## 2020-11-04 PROBLEM — M25.579 PAIN IN UNSPECIFIED ANKLE AND JOINTS OF UNSPECIFIED FOOT: Chronic | Status: ACTIVE | Noted: 2020-10-19

## 2020-11-04 PROCEDURE — 73610 X-RAY EXAM OF ANKLE: CPT | Mod: LT

## 2020-11-04 PROCEDURE — 99072 ADDL SUPL MATRL&STAF TM PHE: CPT

## 2020-11-04 PROCEDURE — 99024 POSTOP FOLLOW-UP VISIT: CPT

## 2020-11-04 NOTE — ADDENDUM
[FreeTextEntry1] : I, Myron Lawrence, acted solely as a scribe for Dr. Michael Caicedo on this date 11/04/2020 .\par All medical record entries made by the Scribe were at my, Dr. Michael Caicedo, direction and personally dictated by me on 11/04/2020 . I have reviewed the chart and agree that the record accurately reflects my personal performance of the history, physical exam, assessment and plan. I have also personally directed, reviewed, and agreed with the chart.

## 2020-11-04 NOTE — HISTORY OF PRESENT ILLNESS
[___ Days Post Op] : post op day #[unfilled] [Clean/Dry/Intact] : clean, dry and intact [Healed] : healed [Neuro Intact] : an unremarkable neurological exam [Vascular Intact] : ~T peripheral vascular exam normal [Negative Sin's] : maneuvers demonstrated a negative Sin's sign [Doing Well] : is doing well [Excellent Pain Control] : has excellent pain control [No Sign of Infection] : is showing no signs of infection [Chills] : no chills [Fever] : no fever [Nausea] : no nausea [Vomiting] : no vomiting [Erythema] : not erythematous [Discharge] : absent of discharge [Swelling] : not swollen [Dehiscence] : not dehisced [de-identified] : s/p Left ankle arthroscopy with extensive debridement and synovectomy, left ankle lateral ligament stabilization via Brostrom procedure.\par DOS 10/20/2020 [de-identified] : 25 year old female present in the office 15 days post op from Left ankle arthroscopy with extensive debridement and synovectomy, left ankle lateral ligament stabilization via Brostrom procedure. The patient's pain is noted to be a 4/10. The pain and swelling are noted to be the same. The patient presents ambulating in crutches. She presents in a splint today. She is currently taking aspirin once per day. No other complaints at this time.  [de-identified] : General: Alert and oriented x3. In no acute distress. Pleasant in nature with a normal affect. No apparent respiratory distress.\par The wound is intact. No evidence of any diastases or dehiscence. No surrounding erythema noted. No fluctuance. The area is warm and well perfused. The patient is able to wiggle their toes. Neurovascularly intact.  [de-identified] : 3V of the left ankle were ordered obtained and reviewed by me today, 11/04/2020 , revealed: No significant abnormalities. [de-identified] : Patient is a 25 year old female present in the office today 15 days s/p Left ankle arthroscopy with extensive debridement and synovectomy, left ankle lateral ligament stabilization via Brostrom procedure. 2 sutures were removed today. The splint was removed today. I advised the patient to continue taking aspirin for blood thinning purposes. I recommend the patient undergo a course of physical therapy, beginning in 1 week from today for the left ankle 2-3 times a week for a total of 6-8 weeks. A prescription was given for the physical therapy today. I recommend that the patient utilize a CAM boot. The patient was fitted with a CAM boot in the office today. The patient was educated about the boot wear pattern and utilization, as well as the timeframe to come out of the boot. She was also given full instructions for using the boot. The patient should remain out of work until further notice. I would like to see the patient back in the office in 1 week to reassess their condition and remove the rest of the sutures. I have addressed all the patient's concerns surrounding the pathology of their condition. The patient understood and verbally agreed to the treatment plan. All of their questions were answered and they were satisfied with the visit. The patient should call the office if they have any questions or experience worsening symptoms.

## 2020-11-13 ENCOUNTER — APPOINTMENT (OUTPATIENT)
Dept: ORTHOPEDIC SURGERY | Facility: CLINIC | Age: 25
End: 2020-11-13
Payer: MEDICAID

## 2020-11-13 PROCEDURE — 99024 POSTOP FOLLOW-UP VISIT: CPT

## 2020-11-13 NOTE — HISTORY OF PRESENT ILLNESS
[de-identified] : s/p Left ankle arthroscopy with extensive debridement and synovectomy, left ankle lateral ligament stabilization via Brostrom procedure.\par DOS 10/20/2020. [de-identified] : Addie presents an office for a followup of her left ankle status post surgery. She presents in office for suture removal and wound check. She denies fevers, chills, night sweats. Her pain scale is controlled, 3/10 in office. She presents nonweight bearing with crutches but she is not wearing her boot. She has not started outpatient physical therapy. She continues aspirin for DVT prophylaxis. She has no calf pain or shortness of breath. She has no other complaints. [de-identified] : Left Ankle Physical Examination:\par \par General: Alert and oriented x3.  In no acute distress.  Pleasant in nature with a normal affect.  No apparent respiratory distress. \par Erythema, Warmth, Rubor: Negative\par Swelling: Negative\par \par *The incisions are clean, dry, intact and well-healed. All sutures were removed.\par \par ROM:\par 1. Dorsiflexion: 0 degrees\par 2. Plantarflexion: 30 degrees\par 3. Inversion: 5 degrees\par 4. Eversion: 5 degrees\par \par Tenderness to Palpation: \par 1. Lateral Malleolus: Negative\par 2. Medial Malleolus: Negative\par 3. Proximal Fibular Pain: Negative\par 4. Heel Pain: Negative\par 5. Cuboid: Negative\par 6. Navicular: Negative\par 7. Tibiotalar Joint: Negative\par 8. Subtalar Joint: Negative\par 9. Posterior Recess: Negative\par \par Tendon Pain:\par 1. Achilles: Negative\par 2. Peroneals: Negative\par 3. Posterior Tibialis: Negative\par 4. Tibialis Anterior: Negative\par \par Ligament Pain:\par 1. ATFL: Positive\par 2. CFL: Negative \par 3. PTFL: Negative\par 4. Deltoid Ligaments: Negative\par 5. Lis Franc Ligament: Negative\par \par Stability: \par 1. Anterior Drawer: Negative\par 2. Posterior Drawer: Negative\par \par Strength: 5/5 TA/GS/EHL\par \par Pulses: 2+ DP/PT Pulses\par \par Neuro: Intact motor and sensory\par \par Additional Test:\par 1. Calcaneal Squeeze Test: Negative\par 2. Syndesmosis Squeeze Test: Negative\par  [de-identified] : No imaging performed. [de-identified] : s/p Left ankle arthroscopy with extensive debridement and synovectomy, left ankle lateral ligament stabilization via Brostrom procedure.\par DOS 10/20/2020. [de-identified] : #1. Physical therapy prescription given.\par #2. ASO brace given. The patient is to wear the boot for one to 2 more weeks and then she can transfer into the ASO brace while in physical therapy.\par #3. Continue ice and elevation of the ankle.\par #4. Continue NSAIDs/Tylenol as needed for pain.\par #5. We will see the patient back in the office in 3-4 weeks. All of her questions were answered as well as her mother's.

## 2020-11-18 RX ORDER — ASPIRIN 325 MG/1
325 TABLET, FILM COATED ORAL
Qty: 30 | Refills: 1 | Status: ACTIVE | COMMUNITY
Start: 2020-11-13 | End: 1900-01-01

## 2020-12-04 ENCOUNTER — APPOINTMENT (OUTPATIENT)
Dept: ORTHOPEDIC SURGERY | Facility: CLINIC | Age: 25
End: 2020-12-04
Payer: MEDICAID

## 2020-12-04 PROCEDURE — 99024 POSTOP FOLLOW-UP VISIT: CPT

## 2020-12-04 PROCEDURE — 73610 X-RAY EXAM OF ANKLE: CPT | Mod: 26,LT

## 2020-12-04 NOTE — HISTORY OF PRESENT ILLNESS
[de-identified] : s/p Left ankle arthroscopy with extensive debridement and synovectomy, left ankle lateral ligament stabilization via Brostrom procedure.\par DOS 10/20/2020. [de-identified] : Addie presents an office for a followup of her left ankle status post surgery. She presents in office for follow-up. She denies fevers, chills, night sweats. Her pain scale is controlled, 4/10 in office. She presents nonweightbearing with crutches and using her ASO brace for support. She has started outpatient physical therapy. She continues aspirin for DVT prophylaxis. She has no calf pain or shortness of breath. She has no other complaints. [de-identified] : Left Ankle Physical Examination:\par \par General: Alert and oriented x3.  In no acute distress.  Pleasant in nature with a normal affect.  No apparent respiratory distress. \par Erythema, Warmth, Rubor: Negative\par Swelling: Negative\par \par *The incisions are clean, dry, intact and well-healed. \par \par ROM:\par 1. Dorsiflexion: 0 degrees\par 2. Plantarflexion: 30 degrees\par 3. Inversion: 5 degrees\par 4. Eversion: 5 degrees\par \par Tenderness to Palpation: \par 1. Lateral Malleolus: Negative\par 2. Medial Malleolus: Negative\par 3. Proximal Fibular Pain: Negative\par 4. Heel Pain: Negative\par 5. Cuboid: Negative\par 6. Navicular: Negative\par 7. Tibiotalar Joint: Negative\par 8. Subtalar Joint: Negative\par 9. Posterior Recess: Negative\par \par Tendon Pain:\par 1. Achilles: Negative\par 2. Peroneals: Negative\par 3. Posterior Tibialis: Negative\par 4. Tibialis Anterior: Negative\par \par Ligament Pain:\par 1. ATFL: Positive\par 2. CFL: Negative \par 3. PTFL: Negative\par 4. Deltoid Ligaments: Negative\par 5. Lis Franc Ligament: Negative\par \par Stability: \par 1. Anterior Drawer: Negative\par 2. Posterior Drawer: Negative\par \par Strength: 5/5 TA/GS/EHL\par \par Pulses: 2+ DP/PT Pulses\par \par Neuro: Intact motor and sensory\par \par Additional Test:\par 1. Calcaneal Squeeze Test: Negative\par 2. Syndesmosis Squeeze Test: Negative\par  [de-identified] : X-rays of the left ankle: No abnormalties seen.  [de-identified] : s/p Left ankle arthroscopy with extensive debridement and synovectomy, left ankle lateral ligament stabilization via Brostrom procedure.\par DOS 10/20/2020. [de-identified] : #1. Updated physical therapy prescription given.  Start to place weight on the ankle and foot slowly and continue with ROM and strength. \par #2. ASO brace for support.  Wear as instructed. \par #3. Continue ice and elevation of the ankle.\par #4. Continue NSAIDs/Tylenol as needed for pain.\par #5. We will see the patient back in the office in 6 weeks. All of her questions were answered as well as her mother's.

## 2021-01-14 ENCOUNTER — APPOINTMENT (OUTPATIENT)
Dept: ORTHOPEDIC SURGERY | Facility: CLINIC | Age: 26
End: 2021-01-14
Payer: MEDICAID

## 2021-01-14 PROCEDURE — 99024 POSTOP FOLLOW-UP VISIT: CPT

## 2021-01-14 NOTE — HISTORY OF PRESENT ILLNESS
[___ Months Post Op] : [unfilled] months post op [Clean/Dry/Intact] : clean, dry and intact [Healed] : healed [Neuro Intact] : an unremarkable neurological exam [Vascular Intact] : ~T peripheral vascular exam normal [Negative Sin's] : maneuvers demonstrated a negative Sin's sign [Doing Well] : is doing well [Excellent Pain Control] : has excellent pain control [No Sign of Infection] : is showing no signs of infection [Swelling] : swollen [Chills] : no chills [Fever] : no fever [Nausea] : no nausea [Vomiting] : no vomiting [Erythema] : not erythematous [Discharge] : absent of discharge [Dehiscence] : not dehisced [de-identified] : s/p Left ankle arthroscopy with extensive debridement and synovectomy, left ankle lateral ligament stabilization via Brostrom procedure.\par DOS 10/20/2020.  [de-identified] : 25 year old female present in the office 3 months post op from Left ankle arthroscopy with extensive debridement and synovectomy, left ankle lateral ligament stabilization via Brostrom procedure. The patient's pain is noted to be a 5/10. No pain in the office today. The pain and swelling are noted to be the same compared to the previous visit. She states the ankle feels tight, and c/o cracking in the ankle without pain. The patient is currently in physical therapy. She is not currently taking any pain medication. No other complaints at this time.  [de-identified] : General: Alert and oriented x3. In no acute distress. Pleasant in nature with a normal affect. No apparent respiratory distress.\par The wound is intact. No evidence of any diastases or dehiscence. No surrounding erythema noted. No fluctuance. The area is warm and well perfused. The patient is able to wiggle their toes. Neurovascularly intact.  [de-identified] : None new obtained.  [de-identified] : Patient is a 25 year old female present in the office today 3 months s/p Left ankle arthroscopy with extensive debridement and synovectomy, left ankle lateral ligament stabilization via Brostrom procedure. I recommend the patient undergo a course of physical therapy for the left ankle 2-3 times a week for a total of 6-8 weeks. A prescription was given for the physical therapy today. I advised the patient to keep the wound clean and dry. I recommend that the patient utilize ice and NSAIDs PRN. The patient is clear to return to work at this time, and advised to utilize the ankle brace/sleeve PRN. I would like to see the patient back in the office in 2-3 months to reassess their condition. I have addressed all the patient's concerns surrounding the pathology of their condition. The patient understood and verbally agreed to the treatment plan. All of their questions were answered and they were satisfied with the visit. The patient should call the office if they have any questions or experience worsening symptoms.

## 2021-01-25 ENCOUNTER — TRANSCRIPTION ENCOUNTER (OUTPATIENT)
Age: 26
End: 2021-01-25

## 2021-02-08 ENCOUNTER — TRANSCRIPTION ENCOUNTER (OUTPATIENT)
Age: 26
End: 2021-02-08

## 2021-03-08 ENCOUNTER — TRANSCRIPTION ENCOUNTER (OUTPATIENT)
Age: 26
End: 2021-03-08

## 2021-03-14 ENCOUNTER — TRANSCRIPTION ENCOUNTER (OUTPATIENT)
Age: 26
End: 2021-03-14

## 2021-04-16 ENCOUNTER — APPOINTMENT (OUTPATIENT)
Dept: ORTHOPEDIC SURGERY | Facility: CLINIC | Age: 26
End: 2021-04-16
Payer: MEDICAID

## 2021-04-16 PROCEDURE — 99072 ADDL SUPL MATRL&STAF TM PHE: CPT

## 2021-04-16 PROCEDURE — 20610 DRAIN/INJ JOINT/BURSA W/O US: CPT | Mod: LT

## 2021-04-16 PROCEDURE — 99213 OFFICE O/P EST LOW 20 MIN: CPT | Mod: 25

## 2021-04-16 NOTE — DISCUSSION/SUMMARY
[de-identified] : Today I had a lengthy discussion with the patient regarding their left ankle pain. I have addressed all the patient's concerns surrounding the pathology of their condition. I recommend the patient undergo a course of physical therapy for the left ankle 2-3 times a week for a total of 6-8 weeks. A prescription was given for the physical therapy today. A discussion was had about a possible cortisone injection for the left ankle. The patient wanted to move forward with the procedure. The injection was administered in the office today. The patient tolerated the procedure well with no resistance. I advised the patient to continue with home exercises, and I advised her to avoid high impact activities, and avoid putting stress on the ankle. I would like to see the patient back in the office in 3 months to reassess their condition. The patient understood and verbally agreed to the treatment plan. All of their questions were answered and they were satisfied with the visit. The patient should call the office if they have any questions or experience worsening symptoms.

## 2021-04-16 NOTE — PHYSICAL EXAM
[de-identified] : General: Alert and oriented x3. In no acute distress. Pleasant in nature with a normal affect. No apparent respiratory distress.\par \par L Ankle Exam\par Skin: Clean, dry, intact\par Inspection: No obvious malalignment, no swelling, no effusion; no lymphadenopathy\par Pulses: 2+ DP/PT pulses\par ROM: L Ankle 10 degrees of dorsiflexion, 40 degrees of plantarflexion, 10 degrees of subtalar motion\par Tenderness: No tenderness over the lateral malleolus, no CFL/ATFL/PTFL pain. No medial malleolus pain, no deltoid ligament pain. No proximal fibular pain. No heel pain.\par Stability: Negative anterior/posterior drawer.\par Strength: 5/5 TA/GS/EHL\par Neuro: In tact to light touch throughout\par Additional tests: Negative Mortons test, Negative syndesmosis squeeze test.  [de-identified] : None new obtained.

## 2021-04-16 NOTE — ADDENDUM
[FreeTextEntry1] : I, Myron Lawrence, acted solely as a scribe for Dr. Michael Caicedo on this date 04/16/2021 .\par All medical record entries made by the Scribe were at my, Dr. Michael Caicedo, direction and personally dictated by me on 04/16/2021 . I have reviewed the chart and agree that the record accurately reflects my personal performance of the history, physical exam, assessment and plan. I have also personally directed, reviewed, and agreed with the chart.

## 2021-04-16 NOTE — HISTORY OF PRESENT ILLNESS
[FreeTextEntry1] : 26 year old female presenting with left ankle pain. The patient’s pain is noted to be a 4/10. The pain and swelling are noted to be the same compared to the  visit. She does c/o swelling and cracking in the ankle, and c/o pressure on the lateral aspect of the ankle. She is currently taking no pain medication. No other complaints at this time.

## 2021-04-16 NOTE — PROCEDURE
[FreeTextEntry1] : Left Ankle Injection\par \par The risks and benefits of the injection were reviewed with the patient today in office and all their questions were answered. The injection site was the medial aspect of the left ankle with the patient lying down. Prior to giving the injection the injection site was prepped with chloraprep and a sterile field was created. Sterile technique was carried out throughout the procedure. After verbal consent from the patient we went ahead and injected the left ankle today with 1 ml dexamethasone and 3 ml of .25% Bupivacaine for a total of 4 ml with a 25 arti 1.5" needle. The medication was placed into the ankle without complication. Post injection the patient reported no pain, had a normal gait and good motion of the left ankle. The patient denied numbness and tingling going down their ankle. There were no complications during the procedure.

## 2021-05-17 ENCOUNTER — TRANSCRIPTION ENCOUNTER (OUTPATIENT)
Age: 26
End: 2021-05-17

## 2021-06-07 ENCOUNTER — EMERGENCY (EMERGENCY)
Facility: HOSPITAL | Age: 26
LOS: 0 days | Discharge: HOME | End: 2021-06-07
Attending: EMERGENCY MEDICINE
Payer: MEDICAID

## 2021-06-07 VITALS — HEIGHT: 64 IN | WEIGHT: 160.06 LBS

## 2021-06-07 VITALS
SYSTOLIC BLOOD PRESSURE: 115 MMHG | RESPIRATION RATE: 18 BRPM | HEART RATE: 88 BPM | DIASTOLIC BLOOD PRESSURE: 75 MMHG | TEMPERATURE: 98 F | OXYGEN SATURATION: 100 %

## 2021-06-07 DIAGNOSIS — R10.9 UNSPECIFIED ABDOMINAL PAIN: ICD-10-CM

## 2021-06-07 DIAGNOSIS — Z87.39 PERSONAL HISTORY OF OTHER DISEASES OF THE MUSCULOSKELETAL SYSTEM AND CONNECTIVE TISSUE: ICD-10-CM

## 2021-06-07 DIAGNOSIS — N20.1 CALCULUS OF URETER: ICD-10-CM

## 2021-06-07 LAB
ABO RH CONFIRMATION: SIGNIFICANT CHANGE UP
ALBUMIN SERPL ELPH-MCNC: 4.1 G/DL — SIGNIFICANT CHANGE UP (ref 3.3–5)
ALP SERPL-CCNC: 67 U/L — SIGNIFICANT CHANGE UP (ref 40–120)
ALT FLD-CCNC: 27 U/L — SIGNIFICANT CHANGE UP (ref 12–78)
ANION GAP SERPL CALC-SCNC: 7 MMOL/L — SIGNIFICANT CHANGE UP (ref 5–17)
APPEARANCE UR: CLEAR — SIGNIFICANT CHANGE UP
APTT BLD: 25.8 SEC — LOW (ref 27.5–35.5)
AST SERPL-CCNC: 22 U/L — SIGNIFICANT CHANGE UP (ref 15–37)
BACTERIA # UR AUTO: ABNORMAL
BASOPHILS # BLD AUTO: 0.05 K/UL — SIGNIFICANT CHANGE UP (ref 0–0.2)
BASOPHILS NFR BLD AUTO: 0.6 % — SIGNIFICANT CHANGE UP (ref 0–2)
BILIRUB SERPL-MCNC: 0.4 MG/DL — SIGNIFICANT CHANGE UP (ref 0.2–1.2)
BILIRUB UR-MCNC: NEGATIVE — SIGNIFICANT CHANGE UP
BLD GP AB SCN SERPL QL: SIGNIFICANT CHANGE UP
BUN SERPL-MCNC: 10 MG/DL — SIGNIFICANT CHANGE UP (ref 7–23)
CALCIUM SERPL-MCNC: 9.3 MG/DL — SIGNIFICANT CHANGE UP (ref 8.5–10.1)
CHLORIDE SERPL-SCNC: 109 MMOL/L — HIGH (ref 96–108)
CO2 SERPL-SCNC: 25 MMOL/L — SIGNIFICANT CHANGE UP (ref 22–31)
COD CRY URNS QL: ABNORMAL
COLOR SPEC: YELLOW — SIGNIFICANT CHANGE UP
COMMENT - URINE: SIGNIFICANT CHANGE UP
CREAT SERPL-MCNC: 0.71 MG/DL — SIGNIFICANT CHANGE UP (ref 0.5–1.3)
DIFF PNL FLD: ABNORMAL
EOSINOPHIL # BLD AUTO: 0.17 K/UL — SIGNIFICANT CHANGE UP (ref 0–0.5)
EOSINOPHIL NFR BLD AUTO: 2.1 % — SIGNIFICANT CHANGE UP (ref 0–6)
EPI CELLS # UR: SIGNIFICANT CHANGE UP
GLUCOSE SERPL-MCNC: 101 MG/DL — HIGH (ref 70–99)
GLUCOSE UR QL: NEGATIVE MG/DL — SIGNIFICANT CHANGE UP
HCG SERPL-ACNC: <1 MIU/ML — SIGNIFICANT CHANGE UP
HCT VFR BLD CALC: 37.7 % — SIGNIFICANT CHANGE UP (ref 34.5–45)
HGB BLD-MCNC: 12.4 G/DL — SIGNIFICANT CHANGE UP (ref 11.5–15.5)
IMM GRANULOCYTES NFR BLD AUTO: 0.1 % — SIGNIFICANT CHANGE UP (ref 0–1.5)
INR BLD: 1.07 RATIO — SIGNIFICANT CHANGE UP (ref 0.88–1.16)
KETONES UR-MCNC: ABNORMAL
LACTATE SERPL-SCNC: 1 MMOL/L — SIGNIFICANT CHANGE UP (ref 0.7–2)
LEUKOCYTE ESTERASE UR-ACNC: ABNORMAL
LYMPHOCYTES # BLD AUTO: 2.54 K/UL — SIGNIFICANT CHANGE UP (ref 1–3.3)
LYMPHOCYTES # BLD AUTO: 31.9 % — SIGNIFICANT CHANGE UP (ref 13–44)
MCHC RBC-ENTMCNC: 28 PG — SIGNIFICANT CHANGE UP (ref 27–34)
MCHC RBC-ENTMCNC: 32.9 GM/DL — SIGNIFICANT CHANGE UP (ref 32–36)
MCV RBC AUTO: 85.1 FL — SIGNIFICANT CHANGE UP (ref 80–100)
MONOCYTES # BLD AUTO: 0.71 K/UL — SIGNIFICANT CHANGE UP (ref 0–0.9)
MONOCYTES NFR BLD AUTO: 8.9 % — SIGNIFICANT CHANGE UP (ref 2–14)
NEUTROPHILS # BLD AUTO: 4.49 K/UL — SIGNIFICANT CHANGE UP (ref 1.8–7.4)
NEUTROPHILS NFR BLD AUTO: 56.4 % — SIGNIFICANT CHANGE UP (ref 43–77)
NITRITE UR-MCNC: NEGATIVE — SIGNIFICANT CHANGE UP
PH UR: 5 — SIGNIFICANT CHANGE UP (ref 5–8)
PLATELET # BLD AUTO: 333 K/UL — SIGNIFICANT CHANGE UP (ref 150–400)
POTASSIUM SERPL-MCNC: 3.2 MMOL/L — LOW (ref 3.5–5.3)
POTASSIUM SERPL-SCNC: 3.2 MMOL/L — LOW (ref 3.5–5.3)
PROT SERPL-MCNC: 8.1 GM/DL — SIGNIFICANT CHANGE UP (ref 6–8.3)
PROT UR-MCNC: 15 MG/DL
PROTHROM AB SERPL-ACNC: 12.4 SEC — SIGNIFICANT CHANGE UP (ref 10.6–13.6)
RBC # BLD: 4.43 M/UL — SIGNIFICANT CHANGE UP (ref 3.8–5.2)
RBC # FLD: 13.6 % — SIGNIFICANT CHANGE UP (ref 10.3–14.5)
RBC CASTS # UR COMP ASSIST: ABNORMAL /HPF (ref 0–4)
SODIUM SERPL-SCNC: 141 MMOL/L — SIGNIFICANT CHANGE UP (ref 135–145)
SP GR SPEC: 1.02 — SIGNIFICANT CHANGE UP (ref 1.01–1.02)
UROBILINOGEN FLD QL: NEGATIVE MG/DL — SIGNIFICANT CHANGE UP
WBC # BLD: 7.97 K/UL — SIGNIFICANT CHANGE UP (ref 3.8–10.5)
WBC # FLD AUTO: 7.97 K/UL — SIGNIFICANT CHANGE UP (ref 3.8–10.5)
WBC UR QL: SIGNIFICANT CHANGE UP

## 2021-06-07 PROCEDURE — 96361 HYDRATE IV INFUSION ADD-ON: CPT

## 2021-06-07 PROCEDURE — 85610 PROTHROMBIN TIME: CPT

## 2021-06-07 PROCEDURE — 96366 THER/PROPH/DIAG IV INF ADDON: CPT

## 2021-06-07 PROCEDURE — 96375 TX/PRO/DX INJ NEW DRUG ADDON: CPT

## 2021-06-07 PROCEDURE — 76830 TRANSVAGINAL US NON-OB: CPT | Mod: 26

## 2021-06-07 PROCEDURE — 76770 US EXAM ABDO BACK WALL COMP: CPT | Mod: 26

## 2021-06-07 PROCEDURE — 99284 EMERGENCY DEPT VISIT MOD MDM: CPT | Mod: 25

## 2021-06-07 PROCEDURE — 83605 ASSAY OF LACTIC ACID: CPT

## 2021-06-07 PROCEDURE — 87086 URINE CULTURE/COLONY COUNT: CPT

## 2021-06-07 PROCEDURE — 81001 URINALYSIS AUTO W/SCOPE: CPT

## 2021-06-07 PROCEDURE — 86901 BLOOD TYPING SEROLOGIC RH(D): CPT

## 2021-06-07 PROCEDURE — 36415 COLL VENOUS BLD VENIPUNCTURE: CPT

## 2021-06-07 PROCEDURE — 85730 THROMBOPLASTIN TIME PARTIAL: CPT

## 2021-06-07 PROCEDURE — 76770 US EXAM ABDO BACK WALL COMP: CPT

## 2021-06-07 PROCEDURE — 99285 EMERGENCY DEPT VISIT HI MDM: CPT

## 2021-06-07 PROCEDURE — 76830 TRANSVAGINAL US NON-OB: CPT

## 2021-06-07 PROCEDURE — 80053 COMPREHEN METABOLIC PANEL: CPT

## 2021-06-07 PROCEDURE — 96374 THER/PROPH/DIAG INJ IV PUSH: CPT

## 2021-06-07 PROCEDURE — 86850 RBC ANTIBODY SCREEN: CPT

## 2021-06-07 PROCEDURE — 86900 BLOOD TYPING SEROLOGIC ABO: CPT

## 2021-06-07 PROCEDURE — 84702 CHORIONIC GONADOTROPIN TEST: CPT

## 2021-06-07 PROCEDURE — 85025 COMPLETE CBC W/AUTO DIFF WBC: CPT

## 2021-06-07 RX ORDER — MORPHINE SULFATE 50 MG/1
4 CAPSULE, EXTENDED RELEASE ORAL ONCE
Refills: 0 | Status: DISCONTINUED | OUTPATIENT
Start: 2021-06-07 | End: 2021-06-07

## 2021-06-07 RX ORDER — SODIUM CHLORIDE 9 MG/ML
1000 INJECTION INTRAMUSCULAR; INTRAVENOUS; SUBCUTANEOUS ONCE
Refills: 0 | Status: COMPLETED | OUTPATIENT
Start: 2021-06-07 | End: 2021-06-07

## 2021-06-07 RX ORDER — KETOROLAC TROMETHAMINE 30 MG/ML
15 SYRINGE (ML) INJECTION ONCE
Refills: 0 | Status: DISCONTINUED | OUTPATIENT
Start: 2021-06-07 | End: 2021-06-07

## 2021-06-07 RX ORDER — TRAMADOL HYDROCHLORIDE 50 MG/1
1 TABLET ORAL
Qty: 9 | Refills: 0
Start: 2021-06-07 | End: 2021-06-09

## 2021-06-07 RX ADMIN — SODIUM CHLORIDE 2000 MILLILITER(S): 9 INJECTION INTRAMUSCULAR; INTRAVENOUS; SUBCUTANEOUS at 09:21

## 2021-06-07 RX ADMIN — SODIUM CHLORIDE 1000 MILLILITER(S): 9 INJECTION INTRAMUSCULAR; INTRAVENOUS; SUBCUTANEOUS at 10:56

## 2021-06-07 RX ADMIN — Medication 15 MILLIGRAM(S): at 11:08

## 2021-06-07 RX ADMIN — MORPHINE SULFATE 4 MILLIGRAM(S): 50 CAPSULE, EXTENDED RELEASE ORAL at 10:56

## 2021-06-07 RX ADMIN — MORPHINE SULFATE 4 MILLIGRAM(S): 50 CAPSULE, EXTENDED RELEASE ORAL at 09:21

## 2021-06-07 NOTE — ED PROVIDER NOTE - CARE PROVIDER_API CALL
Jaquan Tracy)  Urology  [address], [address] [place] [address]  Phone: [phone]  Fax: [phone]  Follow Up Time: 1-3 Days

## 2021-06-07 NOTE — ED PROVIDER NOTE - CLINICAL SUMMARY MEDICAL DECISION MAKING FREE TEXT BOX
27 y/o F presents to the ED c/o left sided abdominal pain. Plan: transvaginal ultrasound to evaluate for torsion or ovarian cyst. Less suspicious for ectopic pregnancy. Check urinalysis for UTI. Pain control.

## 2021-06-07 NOTE — ED ADULT NURSE NOTE - OBJECTIVE STATEMENT
Pt comes to ED with c/o 10/10 LLQ pain that started at 0830 this morning. Denies dysuria, diarrhea, hematuria, fever.

## 2021-06-07 NOTE — ED ADULT TRIAGE NOTE - CHIEF COMPLAINT QUOTE
Pt. to the ED C/O LLQ Abdominal Pain that started this morning- Denies N/v/D and Fevers- Pt. denies major medical hx

## 2021-06-07 NOTE — ED PROVIDER NOTE - PATIENT PORTAL LINK FT
You can access the FollowMyHealth Patient Portal offered by [facility] by registering at the following website: [email or web address]. By joining [employer]’s FollowMyHealth portal, you will also be able to view your health information using other applications (apps) compatible with our system.

## 2021-06-07 NOTE — ED PROVIDER NOTE - PROGRESS NOTE DETAILS
Pt with ureterolithiasis on US.  No significant hydro.  NO e/o uti.  Pt well appearing after toradol.  Feels improved.  Understands indications to return.  To f/u urology.  D/c home with strict return precautions and prompt outpatient f/u.

## 2021-06-07 NOTE — ED PROVIDER NOTE - OBJECTIVE STATEMENT
25 y/o F with PMHx of osteochondral talar dome lesion and left ankle injury presents  to the ED woke up to left sided abdominal pain at approximately 8:30 AM . Denies diarrhea, vomiting, dysuria, hematuria, fever, vaginal bleeding or discharge. 27 y/o F with PMHx of osteochondral talar dome lesion and left ankle injury presents  to the ED woke up to left sided abdominal pain at approximately 8:30 AM. Denies diarrhea, vomiting, dysuria, hematuria, fever, vaginal bleeding or discharge. NKDA.

## 2021-06-08 LAB
CULTURE RESULTS: SIGNIFICANT CHANGE UP
SPECIMEN SOURCE: SIGNIFICANT CHANGE UP

## 2021-06-09 ENCOUNTER — APPOINTMENT (OUTPATIENT)
Dept: UROLOGY | Facility: CLINIC | Age: 26
End: 2021-06-09
Payer: MEDICAID

## 2021-06-09 VITALS
BODY MASS INDEX: 26.66 KG/M2 | HEART RATE: 72 BPM | WEIGHT: 160 LBS | SYSTOLIC BLOOD PRESSURE: 92 MMHG | HEIGHT: 65 IN | OXYGEN SATURATION: 99 % | DIASTOLIC BLOOD PRESSURE: 64 MMHG

## 2021-06-09 PROCEDURE — 99204 OFFICE O/P NEW MOD 45 MIN: CPT

## 2021-06-09 RX ORDER — OXYCODONE AND ACETAMINOPHEN 5; 325 MG/1; MG/1
5-325 TABLET ORAL
Qty: 16 | Refills: 0 | Status: ACTIVE | COMMUNITY
Start: 2021-06-09 | End: 1900-01-01

## 2021-06-09 NOTE — REVIEW OF SYSTEMS
[Feeling Tired] : feeling tired [Chest Pain] : chest pain [Abdominal Pain] : abdominal pain [Vomiting] : vomiting [Diarrhea] : diarrhea [Joint Pain] : joint pain [Hot Flashes] : hot flashes [Feelings Of Weakness] : feelings of weakness [Negative] : Heme/Lymph [FreeTextEntry2] : High blood pressure

## 2021-06-09 NOTE — HISTORY OF PRESENT ILLNESS
[FreeTextEntry1] : 27 yo female presents for ureteral stone. \par Went to [facility] ED 2 days ago with severe left flank pain and vomiting. \par Had Ultrasound was told has ureteral stone. \par Asked to see Urology. \par Continuing to have off and on left flank pain, at times intense. \par Denies dysuria, hematuria, nausea, vomiting, fever, chills or rigors. \par No prior personal history or family history of kidney stone.

## 2021-06-09 NOTE — PHYSICAL EXAM
[Normal Appearance] : normal appearance [General Appearance - In No Acute Distress] : no acute distress [FreeTextEntry1] : normal peripheral circulation  [] : no respiratory distress [Abdomen Soft] : soft [Costovertebral Angle Tenderness] : no ~M costovertebral angle tenderness [LLQ] : in the left lower quadrant [Normal Station and Gait] : the gait and station were normal for the patient's age [Skin Color & Pigmentation] : normal skin color and pigmentation [No Focal Deficits] : no focal deficits [Oriented To Time, Place, And Person] : oriented to person, place, and time

## 2021-06-09 NOTE — ASSESSMENT
[FreeTextEntry1] : Reviewed outside records. \par 4 mm left distal ureteral stone. \par \par Ureteral stone:\par Left flank pain:\par Will get CT scan- stat. \par Discussed treatment options: Continued medical management Vs Shock wave lithotripsy Vs Ureteroscopy.\par Risks and benefits of each modality were discussed. \par Wants to continue medical management. \par Recommended:\par Good oral hydration.\par Continue Flomax.\par PRN pain medication.\par Strain urine.\par Call for fevers, chill, severe nausea and vomiting, or severe pain or go to ED for worsening of medical condition may need urgent ureteral stent placement. \par \par Will inform results.

## 2021-06-14 ENCOUNTER — NON-APPOINTMENT (OUTPATIENT)
Age: 26
End: 2021-06-14

## 2021-06-15 ENCOUNTER — EMERGENCY (EMERGENCY)
Facility: HOSPITAL | Age: 26
LOS: 0 days | Discharge: HOME | End: 2021-06-15
Attending: EMERGENCY MEDICINE
Payer: MEDICAID

## 2021-06-15 VITALS
RESPIRATION RATE: 20 BRPM | OXYGEN SATURATION: 100 % | SYSTOLIC BLOOD PRESSURE: 98 MMHG | DIASTOLIC BLOOD PRESSURE: 58 MMHG | HEART RATE: 73 BPM | TEMPERATURE: 98 F

## 2021-06-15 VITALS
DIASTOLIC BLOOD PRESSURE: 70 MMHG | RESPIRATION RATE: 20 BRPM | HEART RATE: 96 BPM | TEMPERATURE: 98 F | SYSTOLIC BLOOD PRESSURE: 117 MMHG | OXYGEN SATURATION: 100 %

## 2021-06-15 DIAGNOSIS — Z87.81 PERSONAL HISTORY OF (HEALED) TRAUMATIC FRACTURE: ICD-10-CM

## 2021-06-15 DIAGNOSIS — R19.7 DIARRHEA, UNSPECIFIED: ICD-10-CM

## 2021-06-15 DIAGNOSIS — R10.9 UNSPECIFIED ABDOMINAL PAIN: ICD-10-CM

## 2021-06-15 DIAGNOSIS — R11.2 NAUSEA WITH VOMITING, UNSPECIFIED: ICD-10-CM

## 2021-06-15 DIAGNOSIS — I88.0 NONSPECIFIC MESENTERIC LYMPHADENITIS: ICD-10-CM

## 2021-06-15 DIAGNOSIS — N12 TUBULO-INTERSTITIAL NEPHRITIS, NOT SPECIFIED AS ACUTE OR CHRONIC: ICD-10-CM

## 2021-06-15 DIAGNOSIS — Z87.442 PERSONAL HISTORY OF URINARY CALCULI: ICD-10-CM

## 2021-06-15 LAB
ALBUMIN SERPL ELPH-MCNC: 3.8 G/DL — SIGNIFICANT CHANGE UP (ref 3.3–5)
ALP SERPL-CCNC: 73 U/L — SIGNIFICANT CHANGE UP (ref 40–120)
ALT FLD-CCNC: 28 U/L — SIGNIFICANT CHANGE UP (ref 12–78)
ANION GAP SERPL CALC-SCNC: 9 MMOL/L — SIGNIFICANT CHANGE UP (ref 5–17)
APPEARANCE UR: ABNORMAL
AST SERPL-CCNC: 13 U/L — LOW (ref 15–37)
BASOPHILS # BLD AUTO: 0.05 K/UL — SIGNIFICANT CHANGE UP (ref 0–0.2)
BASOPHILS NFR BLD AUTO: 0.6 % — SIGNIFICANT CHANGE UP (ref 0–2)
BILIRUB SERPL-MCNC: 0.6 MG/DL — SIGNIFICANT CHANGE UP (ref 0.2–1.2)
BILIRUB UR-MCNC: ABNORMAL
BUN SERPL-MCNC: 8 MG/DL — SIGNIFICANT CHANGE UP (ref 7–23)
CALCIUM SERPL-MCNC: 8.9 MG/DL — SIGNIFICANT CHANGE UP (ref 8.5–10.1)
CHLORIDE SERPL-SCNC: 107 MMOL/L — SIGNIFICANT CHANGE UP (ref 96–108)
CO2 SERPL-SCNC: 22 MMOL/L — SIGNIFICANT CHANGE UP (ref 22–31)
COLOR SPEC: YELLOW — SIGNIFICANT CHANGE UP
CREAT SERPL-MCNC: 0.54 MG/DL — SIGNIFICANT CHANGE UP (ref 0.5–1.3)
DIFF PNL FLD: ABNORMAL
EOSINOPHIL # BLD AUTO: 0.1 K/UL — SIGNIFICANT CHANGE UP (ref 0–0.5)
EOSINOPHIL NFR BLD AUTO: 1.2 % — SIGNIFICANT CHANGE UP (ref 0–6)
GLUCOSE SERPL-MCNC: 92 MG/DL — SIGNIFICANT CHANGE UP (ref 70–99)
GLUCOSE UR QL: NEGATIVE MG/DL — SIGNIFICANT CHANGE UP
HCT VFR BLD CALC: 39.3 % — SIGNIFICANT CHANGE UP (ref 34.5–45)
HGB BLD-MCNC: 13.2 G/DL — SIGNIFICANT CHANGE UP (ref 11.5–15.5)
IMM GRANULOCYTES NFR BLD AUTO: 0.2 % — SIGNIFICANT CHANGE UP (ref 0–1.5)
KETONES UR-MCNC: ABNORMAL
LEUKOCYTE ESTERASE UR-ACNC: ABNORMAL
LIDOCAIN IGE QN: 46 U/L — LOW (ref 73–393)
LYMPHOCYTES # BLD AUTO: 1.43 K/UL — SIGNIFICANT CHANGE UP (ref 1–3.3)
LYMPHOCYTES # BLD AUTO: 16.5 % — SIGNIFICANT CHANGE UP (ref 13–44)
MCHC RBC-ENTMCNC: 27.9 PG — SIGNIFICANT CHANGE UP (ref 27–34)
MCHC RBC-ENTMCNC: 33.6 GM/DL — SIGNIFICANT CHANGE UP (ref 32–36)
MCV RBC AUTO: 83.1 FL — SIGNIFICANT CHANGE UP (ref 80–100)
MONOCYTES # BLD AUTO: 0.42 K/UL — SIGNIFICANT CHANGE UP (ref 0–0.9)
MONOCYTES NFR BLD AUTO: 4.8 % — SIGNIFICANT CHANGE UP (ref 2–14)
NEUTROPHILS # BLD AUTO: 6.67 K/UL — SIGNIFICANT CHANGE UP (ref 1.8–7.4)
NEUTROPHILS NFR BLD AUTO: 76.7 % — SIGNIFICANT CHANGE UP (ref 43–77)
NITRITE UR-MCNC: NEGATIVE — SIGNIFICANT CHANGE UP
PH UR: 5 — SIGNIFICANT CHANGE UP (ref 5–8)
PLATELET # BLD AUTO: 372 K/UL — SIGNIFICANT CHANGE UP (ref 150–400)
POTASSIUM SERPL-MCNC: 3.4 MMOL/L — LOW (ref 3.5–5.3)
POTASSIUM SERPL-SCNC: 3.4 MMOL/L — LOW (ref 3.5–5.3)
PROT SERPL-MCNC: 7.5 GM/DL — SIGNIFICANT CHANGE UP (ref 6–8.3)
PROT UR-MCNC: 30 MG/DL
RBC # BLD: 4.73 M/UL — SIGNIFICANT CHANGE UP (ref 3.8–5.2)
RBC # FLD: 13.1 % — SIGNIFICANT CHANGE UP (ref 10.3–14.5)
SODIUM SERPL-SCNC: 138 MMOL/L — SIGNIFICANT CHANGE UP (ref 135–145)
SP GR SPEC: 1.02 — SIGNIFICANT CHANGE UP (ref 1.01–1.02)
UROBILINOGEN FLD QL: NEGATIVE MG/DL — SIGNIFICANT CHANGE UP
WBC # BLD: 8.69 K/UL — SIGNIFICANT CHANGE UP (ref 3.8–10.5)
WBC # FLD AUTO: 8.69 K/UL — SIGNIFICANT CHANGE UP (ref 3.8–10.5)

## 2021-06-15 PROCEDURE — 83690 ASSAY OF LIPASE: CPT

## 2021-06-15 PROCEDURE — 96374 THER/PROPH/DIAG INJ IV PUSH: CPT | Mod: XU

## 2021-06-15 PROCEDURE — 74177 CT ABD & PELVIS W/CONTRAST: CPT | Mod: 26,MA

## 2021-06-15 PROCEDURE — 81001 URINALYSIS AUTO W/SCOPE: CPT

## 2021-06-15 PROCEDURE — 36415 COLL VENOUS BLD VENIPUNCTURE: CPT

## 2021-06-15 PROCEDURE — 96375 TX/PRO/DX INJ NEW DRUG ADDON: CPT

## 2021-06-15 PROCEDURE — 99284 EMERGENCY DEPT VISIT MOD MDM: CPT

## 2021-06-15 PROCEDURE — 74177 CT ABD & PELVIS W/CONTRAST: CPT

## 2021-06-15 PROCEDURE — 87086 URINE CULTURE/COLONY COUNT: CPT

## 2021-06-15 PROCEDURE — 99284 EMERGENCY DEPT VISIT MOD MDM: CPT | Mod: 25

## 2021-06-15 PROCEDURE — 85025 COMPLETE CBC W/AUTO DIFF WBC: CPT

## 2021-06-15 PROCEDURE — 80053 COMPREHEN METABOLIC PANEL: CPT

## 2021-06-15 RX ORDER — ACETAMINOPHEN 500 MG
1000 TABLET ORAL ONCE
Refills: 0 | Status: COMPLETED | OUTPATIENT
Start: 2021-06-15 | End: 2021-06-15

## 2021-06-15 RX ORDER — ONDANSETRON 8 MG/1
4 TABLET, FILM COATED ORAL ONCE
Refills: 0 | Status: COMPLETED | OUTPATIENT
Start: 2021-06-15 | End: 2021-06-15

## 2021-06-15 RX ORDER — ONDANSETRON 8 MG/1
1 TABLET, FILM COATED ORAL
Qty: 12 | Refills: 0
Start: 2021-06-15 | End: 2021-06-18

## 2021-06-15 RX ORDER — SODIUM CHLORIDE 9 MG/ML
2000 INJECTION INTRAMUSCULAR; INTRAVENOUS; SUBCUTANEOUS ONCE
Refills: 0 | Status: COMPLETED | OUTPATIENT
Start: 2021-06-15 | End: 2021-06-15

## 2021-06-15 RX ORDER — CEPHALEXIN 500 MG
1 CAPSULE ORAL
Qty: 14 | Refills: 0
Start: 2021-06-15 | End: 2021-06-21

## 2021-06-15 RX ORDER — CEFTRIAXONE 500 MG/1
1000 INJECTION, POWDER, FOR SOLUTION INTRAMUSCULAR; INTRAVENOUS ONCE
Refills: 0 | Status: COMPLETED | OUTPATIENT
Start: 2021-06-15 | End: 2021-06-15

## 2021-06-15 RX ADMIN — Medication 1000 MILLIGRAM(S): at 15:46

## 2021-06-15 RX ADMIN — SODIUM CHLORIDE 4000 MILLILITER(S): 9 INJECTION INTRAMUSCULAR; INTRAVENOUS; SUBCUTANEOUS at 15:16

## 2021-06-15 RX ADMIN — Medication 1000 MILLIGRAM(S): at 15:31

## 2021-06-15 RX ADMIN — CEFTRIAXONE 1000 MILLIGRAM(S): 500 INJECTION, POWDER, FOR SOLUTION INTRAMUSCULAR; INTRAVENOUS at 15:55

## 2021-06-15 RX ADMIN — Medication 400 MILLIGRAM(S): at 15:16

## 2021-06-15 RX ADMIN — ONDANSETRON 4 MILLIGRAM(S): 8 TABLET, FILM COATED ORAL at 15:16

## 2021-06-15 NOTE — ED STATDOCS - NSFOLLOWUPINSTRUCTIONS_ED_ALL_ED_FT
Mesenteric Adenitis, Adult       Mesenteric adenitis is inflammation of lymph nodes in the membrane that attaches the intestines to the inside wall of the abdomen (mesentery). Lymph nodes are collections of tissue that filter bacteria, viruses, and waste from the bloodstream. They are located in several different areas of the body and are part of the body's disease-fighting system (immune system).    Symptoms of this condition are often similar to inflammation of the appendix (appendicitis). Mesenteric adenitis is a painful condition, but it usually clears up either without treatment or with antibiotic medicine.      What are the causes?    A viral or bacterial infection is the most common cause of this condition. The infection usually starts somewhere else in the body.      What are the signs or symptoms?  Symptoms of this condition include:  •Abdominal pain and tenderness.      •Fever.      •Nausea and vomiting.      •Diarrhea.        How is this diagnosed?  This condition may be diagnosed based on:  •A physical exam and your medical history.      •Blood tests.      •Abdominal ultrasound.      •Abdominal CT scan.        How is this treated?    If your condition is caused by a virus, it usually goes away without treatment within 1–2 weeks. If the cause is a bacterial infection, you will be treated with antibiotic medicines. Your health care provider may recommend medicines to help relieve pain or fever.      Follow these instructions at home:     •Take over-the-counter and prescription medicines only as told by your health care provider.      • Do not drive or use heavy machinery while taking prescription pain medicines.      •If you were prescribed an antibiotic medicine, take it as told by your health care provider. Do not stop taking the antibiotic even if you start to feel better.      •Get plenty of rest.      •Drink enough fluid to keep your urine pale yellow.      •Follow instructions from your health care provider about eating or drinking restrictions.      •Keep all follow-up visits as told by your health care provider. This is important.        Contact a health care provider if:    •You have a fever.        Get help right away if:    •Your pain becomes severe or does not go away.      •You vomit repeatedly.      •You have severe pain in the lower, right part of your abdomen. This may be appendicitis.      •You have bright red or black, tarry stools.        Summary    •Mesenteric adenitis is inflammation of lymph nodes in the membrane that attaches the intestines to the inside wall of the abdomen (mesentery).      •Mesenteric adenitis is a painful condition, but it usually clears up either without treatment or with antibiotic medicine.      •A viral or bacterial infection is the most common cause of this condition.      This information is not intended to replace advice given to you by your health care provider. Make sure you discuss any questions you have with your health care provider.    Kidney Infection    WHAT YOU NEED TO KNOW:    A kidney infection, or pyelonephritis, is a bacterial infection. The infection usually starts in your bladder or urethra and moves into your kidney. One or both kidneys may be infected. Kidney, Ureters, Bladder         DISCHARGE INSTRUCTIONS:    Return to the emergency department if:     You have a fever and chills.       You cannot stop vomiting.      You have severe pain in your abdomen, lower back, or sides.    Contact your healthcare provider if:     You continue to have a fever after you take antibiotics for 3 days.      You have pain when you urinate, even after treatment.      Your signs and symptoms return.      You have questions or concerns about your condition or care.    Medicines: You may need any of the following:     Antibiotics treat your bacterial infection.       Acetaminophen decreases pain and fever. It is available without a doctor's order. Ask how much to take and how often to take it. Follow directions. Read the labels of all other medicines you are using to see if they also contain acetaminophen, or ask your doctor or pharmacist. Acetaminophen can cause liver damage if not taken correctly. Do not use more than 4 grams (4,000 milligrams) total of acetaminophen in one day.       NSAIDs, such as ibuprofen, help decrease swelling, pain, and fever. This medicine is available with or without a doctor's order. NSAIDs can cause stomach bleeding or kidney problems in certain people. If you take blood thinner medicine, always ask if NSAIDs are safe for you. Always read the medicine label and follow directions. Do not give these medicines to children under 6 months of age without direction from your child's healthcare provider.      Prescription pain medicine may be given. Ask how to take this medicine safely.      Take your medicine as directed. Contact your healthcare provider if you think your medicine is not helping or if you have side effects. Tell him of her if you are allergic to any medicine. Keep a list of the medicines, vitamins, and herbs you take. Include the amounts, and when and why you take them. Bring the list or the pill bottles to follow-up visits. Carry your medicine list with you in case of an emergency.    Drink liquids as directed: You may need to drink extra liquids to help flush your kidneys and urinary system. Water is the best liquid to drink. Ask your healthcare provider how much liquid to drink each day and which liquids are best for you.    Urinate as soon as you feel the urge: This will help flush bacteria from your urinary system. Do not wait or hold your urine for too long.     Clean your genital area every day with soap and water: Wipe from front to back after you urinate or have a bowel movement. Wear cotton underwear. Fabrics such as nylon and polyester can stay damp. This can increase your risk for infection. Urinate within 15 minutes after you have sex.    Follow up with your healthcare provider as directed: Write down your questions so you remember to ask them during your visits.

## 2021-06-15 NOTE — ED STATDOCS - ATTENDING CONTRIBUTION TO CARE
I, Latonia Malone MD,  performed the initial face to face bedside interview with this patient regarding history of present illness, review of symptoms and relevant past medical, social and family history.  I completed an independent physical examination.  I was the initial provider who evaluated this patient. I have signed out the follow up of any pending tests (i.e. labs, radiological studies) to the ACP.  I have communicated the patient’s plan of care and disposition with the ACP.  The history, relevant review of systems, past medical and surgical history, medical decision making, and physical examination was documented by the scribe in my presence and I attest to the accuracy of the documentation.

## 2021-06-15 NOTE — ED STATDOCS - PATIENT PORTAL LINK FT
You can access the FollowMyHealth Patient Portal offered by [facility] by registering at the following website: [email or web address]. By joining [employer]’s FollowMyHealth portal, you will also be able to view your health information using other applications (apps) compatible with our system.

## 2021-06-15 NOTE — ED STATDOCS - NSFOLLOWUPCLINICS_GEN_ALL_ED_FT
[facility]  Family Medicine  [address] [place] [address]  Phone: [phone]  Fax:

## 2021-06-15 NOTE — ED STATDOCS - PROGRESS NOTE DETAILS
Pt. is a 26 year old female Hx Renal colic presents with left flank pain with N/V.  Diagnosed with 4mm distal left ureter at [facility] stone which cleared as of last Thursday.  Pain returned.  Diarrhea today.  Denies fever, dysuria, hematuria,  Evaluated in ED 7 days ago for same.  Will repeat labs, UA, pain management and CT.  Kristina Valdes PA-C CT showing mesenteric lymphadenitis.  Urine dirty sample but with treat with Rocephin secondary to left flank pain.  Pt. to follow with urology and return for any worsening symptoms. Pt. to BE DC with Keflex 500mg PO BID x 7 days.  Zofran for nausea.  Strict return precautions discussed with patient.  Pt. to follow with [facility] for the mesenteric lymphadenitis.  Kristina Valdes PA-C.

## 2021-06-15 NOTE — ED STATDOCS - OBJECTIVE STATEMENT
27 y/o female with no pertinent PMHx presents to the ED c/o flank pain. Pt reports she had kidney stones earlier in the month with associated flank pain. Pt states she received outpatient CT scan which showed -kidney stones, states that kidney stones passed on own with no surgical intervention. Pt states since Thursday she's had worsening flank pain with associated n/v. Pt also reports 1 episode of diarrhea today. Pt reports pain present in lower back and groin area. Denies fevers, chills, hematuria, dysuria. Denies history of kidney stones. No other complaints at this time.

## 2021-06-15 NOTE — ED ADULT NURSE NOTE - OBJECTIVE STATEMENT
pt presents to ED due to complaints of nausea vomiting diarrhea. was seen in  ED and told she had two kidney stones that will pass on their own pain is now so severe

## 2021-06-15 NOTE — ED ADULT TRIAGE NOTE - CHIEF COMPLAINT QUOTE
pt presents to ED due to complaints of nausea vomiting was seen in  ED and told she had two kidney stones that will pass on their own pain is now so severe

## 2021-06-15 NOTE — ED ADULT TRIAGE NOTE - INTERNATIONAL TRAVEL
From: Sarah Carpenter  To: RONIT Cole  Sent: 9/1/2020 2:41 PM EDT  Subject: Prescription Question    Thank you for filling my vitamin prescription. Would you please transfer my vitamin prescription over to [facility] in [place], please. I didn't realize it was sent to [address] [place] [address] never sent me any messages. No

## 2021-06-15 NOTE — ED STATDOCS - CLINICAL SUMMARY MEDICAL DECISION MAKING FREE TEXT BOX
Pt with kidney stones earlier in month, now pt with n/v/d. Pt with outpatient CT scan which showed no kidney stones. PLAN: Labs, UA. Pt with kidney stones earlier in month, now pt with n/v/d. Pt with outpatient CT scan which showed no kidney stones. PLAN: Labs, UA.          Pt. to BE DC with Keflex 500mg PO BID x 7 days.  Zofran for nausea.  Strict return precautions discussed with patient.  Pt. to follow with [facility] for the mesenteric lymphadenitis.  Kristina Valdes PA-C.

## 2021-06-16 ENCOUNTER — APPOINTMENT (OUTPATIENT)
Dept: UROLOGY | Facility: CLINIC | Age: 26
End: 2021-06-16
Payer: MEDICAID

## 2021-06-16 DIAGNOSIS — N20.1 CALCULUS OF URETER: ICD-10-CM

## 2021-06-16 DIAGNOSIS — R10.9 UNSPECIFIED ABDOMINAL PAIN: ICD-10-CM

## 2021-06-16 LAB
CULTURE RESULTS: SIGNIFICANT CHANGE UP
SPECIMEN SOURCE: SIGNIFICANT CHANGE UP

## 2021-06-16 PROCEDURE — 99214 OFFICE O/P EST MOD 30 MIN: CPT

## 2021-06-23 PROBLEM — R10.9 LEFT FLANK PAIN: Status: ACTIVE | Noted: 2021-06-09

## 2021-06-23 PROBLEM — N20.1 LEFT URETERAL STONE: Status: ACTIVE | Noted: 2021-06-09

## 2021-06-23 NOTE — ASSESSMENT
[FreeTextEntry1] : Reviewed outside records. \par No kidney and ureteral stone on CT scan. \par \par Left flank pain:\par Ureteral stone:\par No stone seen. Urinalysis with WBCs and RBCs. Passed ?\par Continue Antibiotics. \par Recommended to take Probiotics. \par Will follow Urine culture. Will inform results. \par Recommended to see Gastroenterologist. \par \par Return to office in 4 weeks or sooner if any issues. \par \par

## 2021-06-23 NOTE — LETTER BODY
[Dear  ___] : Dear  [unfilled], [Courtesy Letter:] : I had the pleasure of seeing your patient, [unfilled], in my office today. [Please see my note below.] : Please see my note below. [Sincerely,] : Sincerely, [FreeTextEntry3] : Nicohlas Pacheco MD\par  of Urology\par [facility]\par \par Offices:\par [facility] @\par [address], [place] [address]\par and\par [address], [place] [address], [address]\par and\par [address] [address]\par \par TEL: [phone]\par FAX: [phone]

## 2021-06-23 NOTE — HISTORY OF PRESENT ILLNESS
[FreeTextEntry1] : 25 yo female presents for follow up. \par Has continued to have left flank pain, nausea and vomiting.\par Yesterday went to [facility] ED again.\par Had another CT scan. Started on Cephalexin for UTI. \par Period ended 2 days ago. \par Having diarrhea.\par \par Initially seen for ureteral stone. \par Went to [facility] ED with severe left flank pain and vomiting. \par Had Ultrasound was told has ureteral stone. \par Asked to see Urology. \par Continued to have off and on left flank pain, at times intense. \par Denied dysuria, hematuria, nausea, vomiting, fever, chills or rigors. \par No prior personal history or family history of kidney stone.

## 2021-07-02 ENCOUNTER — RX CHANGE (OUTPATIENT)
Age: 26
End: 2021-07-02

## 2021-07-07 ENCOUNTER — APPOINTMENT (OUTPATIENT)
Dept: GASTROENTEROLOGY | Facility: CLINIC | Age: 26
End: 2021-07-07
Payer: MEDICAID

## 2021-07-07 VITALS
SYSTOLIC BLOOD PRESSURE: 100 MMHG | HEIGHT: 64 IN | WEIGHT: 151 LBS | BODY MASS INDEX: 25.78 KG/M2 | DIASTOLIC BLOOD PRESSURE: 73 MMHG | HEART RATE: 72 BPM

## 2021-07-07 DIAGNOSIS — Z00.00 ENCOUNTER FOR GENERAL ADULT MEDICAL EXAMINATION W/OUT ABNORMAL FINDINGS: ICD-10-CM

## 2021-07-07 DIAGNOSIS — R11.2 NAUSEA WITH VOMITING, UNSPECIFIED: ICD-10-CM

## 2021-07-07 PROCEDURE — 99203 OFFICE O/P NEW LOW 30 MIN: CPT

## 2021-07-07 NOTE — HISTORY OF PRESENT ILLNESS
[de-identified] : 26 year old woman referred here for nauesa and vomiting requiring ED visit. \par \par Patient states that a few weeks ago had sudden attack of L back pain and severe nausea and vomiting. Went to ED where imaging demonstrated L renal stone with mild pyelonephritis. Given abx and on follow up was feeling better and CT scan demonstrated no stones (likely passed). She then had lower abdominal pain and saw GYN, was diagnosed with a yeast infection and is now on antifungals. \par \par Her severe nausea vomiting has abated and she is tolerating meals. She has no other long term GI issues and prior to this occurrence was feeling well. She has an appointment with a nutritionist this week to discuss food avoidances so she doesn't get stones. Also knows to hydrate well.

## 2021-07-07 NOTE — ASSESSMENT
[FreeTextEntry1] : 26 year old woman referred here for nauesa and vomiting requiring ED visit. \par \par Patient without any chronic GI symptoms and her acute nausea vomiting was in the setting of stone which has since passed. Her symptoms have overall improved and her lower abd issues could be related to her yeast infection. \par \par Told her to wait a few weeks, if still having discomfort or any GI issues to call and we can book EGD/Colonoscopy.

## 2021-07-16 ENCOUNTER — NON-APPOINTMENT (OUTPATIENT)
Age: 26
End: 2021-07-16

## 2021-07-16 ENCOUNTER — APPOINTMENT (OUTPATIENT)
Dept: ORTHOPEDIC SURGERY | Facility: CLINIC | Age: 26
End: 2021-07-16
Payer: MEDICAID

## 2021-07-16 ENCOUNTER — RESULT CHARGE (OUTPATIENT)
Age: 26
End: 2021-07-16

## 2021-07-16 ENCOUNTER — APPOINTMENT (OUTPATIENT)
Dept: UROLOGY | Facility: CLINIC | Age: 26
End: 2021-07-16
Payer: MEDICAID

## 2021-07-16 DIAGNOSIS — N39.0 URINARY TRACT INFECTION, SITE NOT SPECIFIED: ICD-10-CM

## 2021-07-16 LAB
BILIRUB UR QL STRIP: NORMAL
GLUCOSE UR-MCNC: NORMAL
HCG UR QL: 0.2 EU/DL
HGB UR QL STRIP.AUTO: NORMAL
KETONES UR-MCNC: NORMAL
LEUKOCYTE ESTERASE UR QL STRIP: NORMAL
NITRITE UR QL STRIP: NORMAL
PH UR STRIP: 5.5
PROT UR STRIP-MCNC: NORMAL
SP GR UR STRIP: >=1.03

## 2021-07-16 PROCEDURE — 99213 OFFICE O/P EST LOW 20 MIN: CPT

## 2021-07-16 PROCEDURE — 99214 OFFICE O/P EST MOD 30 MIN: CPT

## 2021-07-16 NOTE — ADDENDUM
[FreeTextEntry1] : I, Shayne Lamas, acted solely as a scribe for Dr. Michael Caicedo on this date 07/16/2021  .\par  \par All medical record entries made by the Scribe were at my, Dr. Michael Caicedo, direction and personally dictated by me on 07/16/2021 . I have reviewed the chart and agree that the record accurately reflects my personal performance of the history, physical exam, assessment and plan. I have also personally directed, reviewed, and agreed with the chart.

## 2021-07-16 NOTE — DISCUSSION/SUMMARY
[de-identified] : Today I had a lengthy discussion with the patient regarding their left ankle pain. I have addressed all the patient's concerns surrounding the pathology of their condition. \par \par I recommend the patient undergo a course of physical therapy for the left ankle 2-3 times a week for a total of 6-8 weeks. A prescription was given for the physical therapy today. \par \par I would like to see the patient back in the office in 3-4 months to reassess their condition. The patient understood and verbally agreed to the treatment plan. All of their questions were answered and they were satisfied with the visit. The patient should call the office if they have any questions or experience worsening symptoms.

## 2021-07-16 NOTE — HISTORY OF PRESENT ILLNESS
[FreeTextEntry1] : DAVID CHO is a 26 year old female who presents for follow-up evaluation of left ankle pain. Patient is  s/p Left ankle arthroscopy with extensive debridement and synovectomy, left ankle lateral ligament stabilization via Brostrom procedure DOS 10/20/2020. She states the cortisone 4/16/2021 last visit gave her relief but the pain recently returned. She localizes the pain at the lateral aspect of the ankle. She describes it as pressure pain. She states a few days ago she misstepped on the stairs and may have landed on her left ankle.

## 2021-07-16 NOTE — PHYSICAL EXAM
[de-identified] : General: Alert and oriented x3. In no acute distress. Pleasant in nature with a normal affect. No apparent respiratory distress.\par \par Left Ankle\par Skin: Clean, dry, intact. Healed incision, portals. \par Inspection: No obvious malalignment, no swelling, no effusion; no lymphadenopathy\par Pulses: 2+ DP/PT pulses\par ROM: 10 degrees of dorsiflexion, 40 degrees of plantarflexion, 10 degrees of subtalar motion\par Tenderness: +distal fibula, peroneals. No tenderness over the lateral malleolus, no CFL/ATFL/PTFL pain. No medial malleolus pain, no deltoid ligament pain. No proximal fibular pain. No heel pain.\par Stability: Negative anterior/posterior drawer.\par Strength: 5/5 TA/GS/EHL\par Neuro: In tact to light touch throughout\par Additional tests: Negative Quintanilla's test, Negative syndesmosis squeeze test.  [de-identified] : No new imaging.

## 2021-07-18 ENCOUNTER — RX RENEWAL (OUTPATIENT)
Age: 26
End: 2021-07-18

## 2021-07-19 ENCOUNTER — APPOINTMENT (OUTPATIENT)
Dept: GASTROENTEROLOGY | Facility: CLINIC | Age: 26
End: 2021-07-19
Payer: MEDICAID

## 2021-07-19 VITALS — DIASTOLIC BLOOD PRESSURE: 76 MMHG | HEIGHT: 64 IN | SYSTOLIC BLOOD PRESSURE: 101 MMHG | HEART RATE: 65 BPM

## 2021-07-19 DIAGNOSIS — R10.9 UNSPECIFIED ABDOMINAL PAIN: ICD-10-CM

## 2021-07-19 PROCEDURE — 99213 OFFICE O/P EST LOW 20 MIN: CPT

## 2021-07-19 NOTE — HISTORY OF PRESENT ILLNESS
[de-identified] : 26 year old woman recently seen by me for abdominal pain and nausea but this in the setting of passing a kidney stone and a yeast infection, here for a two week follow up. \par \par Patient states she is doing much better. She saw urologist and thought is she passed a stone, she is drinking more water and saw a nutritionist to see how she can alter her calcium intake to prevent stones from forming. Her yeast infection was treated. She is having no pain, no nausea and no vomiting. She is feeling well and eating well.

## 2021-07-19 NOTE — ASSESSMENT
[FreeTextEntry1] : 26 year old woman recently seen by me for abdominal pain and nausea but this in the setting of passing a kidney stone and a yeast infection, here for a two week follow up. \par \par As suspected patient with improvement in her symptoms. She is asymptomatic so no role for any GI intervention or further work up. If she has any recurrent symptoms then will work up.

## 2021-07-26 NOTE — HISTORY OF PRESENT ILLNESS
[FreeTextEntry1] : 27 yo female presents for urinary tract infection. \par Again went to [facility] ED. Being treated for yeast and urinary tract infection. 2 more days of Antibiotics left. \par Feeling better now. \par Saw Gastroenterologist.\par \par Initially seen for ureteral stone?\par Went to [facility] ED with severe left flank pain and vomiting. \par Had Ultrasound was told has ureteral stone. \par Asked to see Urology. \par Continued to have off and on left flank pain, at times intense. \par Denied dysuria, hematuria, nausea, vomiting, fever, chills or rigors. \par No prior personal history or family history of kidney stone.

## 2021-07-26 NOTE — ASSESSMENT
[FreeTextEntry1] : Recurrent UTIs:\par Recommended: good oral hydration, timed voiding, urinate right after sex, change sanitary pads often, avoid douches, bubble baths and other feminine hygiene products. \par Recommended OTC Cranberry tablets. \par  Recommended post coital prophylaxis with Macrobid 100 mg 30 mins after sexual activity. \par Recommended repeat Urine test 1 week after completion of Antibiotics course. \par \par Return to office in 3 months or sooner if any issues.

## 2021-08-15 ENCOUNTER — EMERGENCY (EMERGENCY)
Facility: HOSPITAL | Age: 26
LOS: 0 days | Discharge: HOME | End: 2021-08-15
Attending: STUDENT IN AN ORGANIZED HEALTH CARE EDUCATION/TRAINING PROGRAM
Payer: MEDICAID

## 2021-08-15 VITALS
HEART RATE: 71 BPM | OXYGEN SATURATION: 100 % | RESPIRATION RATE: 16 BRPM | TEMPERATURE: 98 F | SYSTOLIC BLOOD PRESSURE: 944 MMHG | DIASTOLIC BLOOD PRESSURE: 56 MMHG

## 2021-08-15 VITALS — HEIGHT: 64 IN | WEIGHT: 149.91 LBS

## 2021-08-15 DIAGNOSIS — R10.9 UNSPECIFIED ABDOMINAL PAIN: ICD-10-CM

## 2021-08-15 DIAGNOSIS — R63.0 ANOREXIA: ICD-10-CM

## 2021-08-15 DIAGNOSIS — R11.2 NAUSEA WITH VOMITING, UNSPECIFIED: ICD-10-CM

## 2021-08-15 DIAGNOSIS — N39.0 URINARY TRACT INFECTION, SITE NOT SPECIFIED: ICD-10-CM

## 2021-08-15 DIAGNOSIS — M54.9 DORSALGIA, UNSPECIFIED: ICD-10-CM

## 2021-08-15 DIAGNOSIS — R50.9 FEVER, UNSPECIFIED: ICD-10-CM

## 2021-08-15 LAB
ALBUMIN SERPL ELPH-MCNC: 3.7 G/DL — SIGNIFICANT CHANGE UP (ref 3.3–5)
ALP SERPL-CCNC: 70 U/L — SIGNIFICANT CHANGE UP (ref 40–120)
ALT FLD-CCNC: 19 U/L — SIGNIFICANT CHANGE UP (ref 12–78)
ANION GAP SERPL CALC-SCNC: 6 MMOL/L — SIGNIFICANT CHANGE UP (ref 5–17)
APPEARANCE UR: CLEAR — SIGNIFICANT CHANGE UP
AST SERPL-CCNC: 17 U/L — SIGNIFICANT CHANGE UP (ref 15–37)
BASOPHILS # BLD AUTO: 0.06 K/UL — SIGNIFICANT CHANGE UP (ref 0–0.2)
BASOPHILS NFR BLD AUTO: 0.7 % — SIGNIFICANT CHANGE UP (ref 0–2)
BILIRUB SERPL-MCNC: 0.3 MG/DL — SIGNIFICANT CHANGE UP (ref 0.2–1.2)
BILIRUB UR-MCNC: ABNORMAL
BUN SERPL-MCNC: 8 MG/DL — SIGNIFICANT CHANGE UP (ref 7–23)
CALCIUM SERPL-MCNC: 8.7 MG/DL — SIGNIFICANT CHANGE UP (ref 8.5–10.1)
CHLORIDE SERPL-SCNC: 109 MMOL/L — HIGH (ref 96–108)
CO2 SERPL-SCNC: 23 MMOL/L — SIGNIFICANT CHANGE UP (ref 22–31)
COLOR SPEC: YELLOW — SIGNIFICANT CHANGE UP
CREAT SERPL-MCNC: 0.54 MG/DL — SIGNIFICANT CHANGE UP (ref 0.5–1.3)
DIFF PNL FLD: ABNORMAL
EOSINOPHIL # BLD AUTO: 0.22 K/UL — SIGNIFICANT CHANGE UP (ref 0–0.5)
EOSINOPHIL NFR BLD AUTO: 2.5 % — SIGNIFICANT CHANGE UP (ref 0–6)
GLUCOSE SERPL-MCNC: 87 MG/DL — SIGNIFICANT CHANGE UP (ref 70–99)
GLUCOSE UR QL: NEGATIVE MG/DL — SIGNIFICANT CHANGE UP
HCG SERPL-ACNC: <1 MIU/ML — SIGNIFICANT CHANGE UP
HCT VFR BLD CALC: 36.7 % — SIGNIFICANT CHANGE UP (ref 34.5–45)
HGB BLD-MCNC: 12.1 G/DL — SIGNIFICANT CHANGE UP (ref 11.5–15.5)
IMM GRANULOCYTES NFR BLD AUTO: 0.1 % — SIGNIFICANT CHANGE UP (ref 0–1.5)
KETONES UR-MCNC: ABNORMAL
LEUKOCYTE ESTERASE UR-ACNC: ABNORMAL
LIDOCAIN IGE QN: 61 U/L — LOW (ref 73–393)
LYMPHOCYTES # BLD AUTO: 1.61 K/UL — SIGNIFICANT CHANGE UP (ref 1–3.3)
LYMPHOCYTES # BLD AUTO: 18.7 % — SIGNIFICANT CHANGE UP (ref 13–44)
MCHC RBC-ENTMCNC: 27.8 PG — SIGNIFICANT CHANGE UP (ref 27–34)
MCHC RBC-ENTMCNC: 33 GM/DL — SIGNIFICANT CHANGE UP (ref 32–36)
MCV RBC AUTO: 84.2 FL — SIGNIFICANT CHANGE UP (ref 80–100)
MONOCYTES # BLD AUTO: 0.62 K/UL — SIGNIFICANT CHANGE UP (ref 0–0.9)
MONOCYTES NFR BLD AUTO: 7.2 % — SIGNIFICANT CHANGE UP (ref 2–14)
NEUTROPHILS # BLD AUTO: 6.11 K/UL — SIGNIFICANT CHANGE UP (ref 1.8–7.4)
NEUTROPHILS NFR BLD AUTO: 70.8 % — SIGNIFICANT CHANGE UP (ref 43–77)
NITRITE UR-MCNC: POSITIVE
PH UR: 5 — SIGNIFICANT CHANGE UP (ref 5–8)
PLATELET # BLD AUTO: 279 K/UL — SIGNIFICANT CHANGE UP (ref 150–400)
POTASSIUM SERPL-MCNC: 3.6 MMOL/L — SIGNIFICANT CHANGE UP (ref 3.5–5.3)
POTASSIUM SERPL-SCNC: 3.6 MMOL/L — SIGNIFICANT CHANGE UP (ref 3.5–5.3)
PROT SERPL-MCNC: 7.3 GM/DL — SIGNIFICANT CHANGE UP (ref 6–8.3)
PROT UR-MCNC: 30 MG/DL
RBC # BLD: 4.36 M/UL — SIGNIFICANT CHANGE UP (ref 3.8–5.2)
RBC # FLD: 13.5 % — SIGNIFICANT CHANGE UP (ref 10.3–14.5)
SARS-COV-2 RNA SPEC QL NAA+PROBE: SIGNIFICANT CHANGE UP
SODIUM SERPL-SCNC: 138 MMOL/L — SIGNIFICANT CHANGE UP (ref 135–145)
SP GR SPEC: 1.02 — SIGNIFICANT CHANGE UP (ref 1.01–1.02)
UROBILINOGEN FLD QL: 1 MG/DL
WBC # BLD: 8.63 K/UL — SIGNIFICANT CHANGE UP (ref 3.8–10.5)
WBC # FLD AUTO: 8.63 K/UL — SIGNIFICANT CHANGE UP (ref 3.8–10.5)

## 2021-08-15 PROCEDURE — U0003: CPT

## 2021-08-15 PROCEDURE — 83690 ASSAY OF LIPASE: CPT

## 2021-08-15 PROCEDURE — 71045 X-RAY EXAM CHEST 1 VIEW: CPT | Mod: 26

## 2021-08-15 PROCEDURE — U0005: CPT

## 2021-08-15 PROCEDURE — 80053 COMPREHEN METABOLIC PANEL: CPT

## 2021-08-15 PROCEDURE — 71045 X-RAY EXAM CHEST 1 VIEW: CPT

## 2021-08-15 PROCEDURE — 81001 URINALYSIS AUTO W/SCOPE: CPT

## 2021-08-15 PROCEDURE — 36415 COLL VENOUS BLD VENIPUNCTURE: CPT

## 2021-08-15 PROCEDURE — 87086 URINE CULTURE/COLONY COUNT: CPT

## 2021-08-15 PROCEDURE — 99283 EMERGENCY DEPT VISIT LOW MDM: CPT | Mod: 25

## 2021-08-15 PROCEDURE — 85025 COMPLETE CBC W/AUTO DIFF WBC: CPT

## 2021-08-15 PROCEDURE — 96375 TX/PRO/DX INJ NEW DRUG ADDON: CPT

## 2021-08-15 PROCEDURE — 96374 THER/PROPH/DIAG INJ IV PUSH: CPT

## 2021-08-15 PROCEDURE — 99284 EMERGENCY DEPT VISIT MOD MDM: CPT

## 2021-08-15 PROCEDURE — 84702 CHORIONIC GONADOTROPIN TEST: CPT

## 2021-08-15 PROCEDURE — 81025 URINE PREGNANCY TEST: CPT

## 2021-08-15 RX ORDER — KETOROLAC TROMETHAMINE 30 MG/ML
30 SYRINGE (ML) INJECTION ONCE
Refills: 0 | Status: DISCONTINUED | OUTPATIENT
Start: 2021-08-15 | End: 2021-08-15

## 2021-08-15 RX ORDER — FLUCONAZOLE 150 MG/1
1 TABLET ORAL
Qty: 1 | Refills: 0
Start: 2021-08-15 | End: 2021-08-15

## 2021-08-15 RX ORDER — SODIUM CHLORIDE 9 MG/ML
1000 INJECTION INTRAMUSCULAR; INTRAVENOUS; SUBCUTANEOUS ONCE
Refills: 0 | Status: COMPLETED | OUTPATIENT
Start: 2021-08-15 | End: 2021-08-15

## 2021-08-15 RX ORDER — CEFTRIAXONE 500 MG/1
1000 INJECTION, POWDER, FOR SOLUTION INTRAMUSCULAR; INTRAVENOUS ONCE
Refills: 0 | Status: COMPLETED | OUTPATIENT
Start: 2021-08-15 | End: 2021-08-15

## 2021-08-15 RX ORDER — CEPHALEXIN 500 MG
1 CAPSULE ORAL
Qty: 14 | Refills: 0
Start: 2021-08-15 | End: 2021-08-21

## 2021-08-15 RX ORDER — ONDANSETRON 8 MG/1
4 TABLET, FILM COATED ORAL ONCE
Refills: 0 | Status: COMPLETED | OUTPATIENT
Start: 2021-08-15 | End: 2021-08-15

## 2021-08-15 RX ADMIN — SODIUM CHLORIDE 1000 MILLILITER(S): 9 INJECTION INTRAMUSCULAR; INTRAVENOUS; SUBCUTANEOUS at 11:05

## 2021-08-15 RX ADMIN — Medication 30 MILLIGRAM(S): at 11:05

## 2021-08-15 RX ADMIN — ONDANSETRON 4 MILLIGRAM(S): 8 TABLET, FILM COATED ORAL at 11:05

## 2021-08-15 RX ADMIN — SODIUM CHLORIDE 1000 MILLILITER(S): 9 INJECTION INTRAMUSCULAR; INTRAVENOUS; SUBCUTANEOUS at 11:42

## 2021-08-15 RX ADMIN — Medication 30 MILLIGRAM(S): at 11:38

## 2021-08-15 RX ADMIN — CEFTRIAXONE 1000 MILLIGRAM(S): 500 INJECTION, POWDER, FOR SOLUTION INTRAMUSCULAR; INTRAVENOUS at 11:42

## 2021-08-15 NOTE — ED STATDOCS - ATTENDING CONTRIBUTION TO CARE
I, Karis Chau DO,  performed the initial face to face bedside interview with this patient regarding history of present illness, review of symptoms and relevant past medical, social and family history.  I completed an independent physical examination.  I was the initial provider who evaluated this patient. I have signed out the follow up of any pending tests (i.e. labs, radiological studies) to the ACP.  I have communicated the patient’s plan of care and disposition with the ACP.  The history, relevant review of systems, past medical and surgical history, medical decision making, and physical examination was documented by the scribe in my presence and I attest to the accuracy of the documentation.

## 2021-08-15 NOTE — ED STATDOCS - CLINICAL SUMMARY MEDICAL DECISION MAKING FREE TEXT BOX
female with multiple medical complaints: abdominal pain, blaire pain, vomiting. Labs, urine Xray, COVID swab, reevaluation. 27 y/o female with multiple medical complaints: abdominal pain, back pain, vomiting. Labs, urine Xray, COVID swab, reevaluation.

## 2021-08-15 NOTE — ED ADULT NURSE NOTE - OBJECTIVE STATEMENT
Pt presents to the ED c/o abdominal pain. Pt states over the past 2 weeks, she has had some decreased appetite. 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 States since Friday night, she has had fever, sweats, abdominal pain as well as back pain. States today, she woke up and vomited bile and blood.  VSS

## 2021-08-15 NOTE — ED STATDOCS - PROGRESS NOTE DETAILS
27 y/o F presents to the ED c/o abdominal pain. Pt states over the past 2 weeks, she has had some decreased appetite. 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 States since Friday night, she has had fever, sweats, abdominal pain as well as back pain. States today, she woke up and vomited bile and blood, so came to ED for evaluation.  PE: lungs CTA b/l, heart RRR s1/s2, +mild suprapubic TTP  Plan: labs, urine, CXR, IVF, reassess  Rosmery Tyler PA-C UA with nitrate + UTI, other labs WNL, pt feeling better after medications, will d/c home with outpt f/u with urology, pt see Dr. Pacheco, return precautions given, abx rx sent  Rosmery Tyler PA-C

## 2021-08-15 NOTE — ED STATDOCS - OBJECTIVE STATEMENT
27 y/o female presents to the ED c/o abdominal pain. Pt states over the past few weeks, she has had some decreased appetite. 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 States since Friday night, she has had fever, sweats, abdominal pain as well as back pain. States today, she woke up and vomited bile and blood. Not vaccinated for COVID.  [phone] for COVID results. 27 y/o female presents to the ED c/o abdominal pain. Pt states over the past 2 weeks, she has had some decreased appetite. 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 States since Friday night, she has had fever, sweats, abdominal pain as well as back pain. States today, she woke up and vomited bile and blood, so came to ED for evaluation. Not vaccinated for COVID.  :[phone] for COVID swab results.

## 2021-08-15 NOTE — ED STATDOCS - CHPI ED SYMPTOM POS
+sweats, +back pain/FEVER/NAUSEA/PAIN/VOMITING +sweats, +back pain, +decreased appetite/FEVER/NAUSEA/PAIN/VOMITING

## 2021-08-15 NOTE — ED STATDOCS - NSICDXPASTMEDICALHX_GEN_ALL_CORE_FT
PAST MEDICAL HISTORY:  Ankle injury left    Ankle pain left    No pertinent past medical history     Osteochondral talar dome lesion

## 2021-08-15 NOTE — ED ADULT NURSE NOTE - NSIMPLEMENTINTERV_GEN_ALL_ED
Implemented All Universal Safety Interventions:  [place] to call system. Call bell, personal items and telephone within reach. Instruct patient to call for assistance. Room bathroom lighting operational. Non-slip footwear when patient is off stretcher. Physically safe environment: no spills, clutter or unnecessary equipment. Stretcher in lowest position, wheels locked, appropriate side rails in place.

## 2021-08-15 NOTE — ED STATDOCS - PATIENT PORTAL LINK FT
You can access the FollowMyHealth Patient Portal offered by [facility] by registering at the following website: [email or web address]. By joining [employer]’s FollowMyHealth portal, you will also be able to view your health information using other applications (apps) compatible with our system.

## 2021-08-15 NOTE — ED ADULT TRIAGE NOTE - CHIEF COMPLAINT QUOTE
Pt reports abd pain starting friday night with vomiting.  Denies f/c/c. Reports poor appetite and high stress at her job.

## 2021-08-15 NOTE — ED ADULT NURSE NOTE - CAS ELECT INFOMATION PROVIDED
Pt. DC as ordered. MARLO Mcknight made aware of BP and Ok to DC -- Pt. states to feel much better at this time---Safety maintained/DC instructions

## 2021-08-16 LAB
CULTURE RESULTS: SIGNIFICANT CHANGE UP
SPECIMEN SOURCE: SIGNIFICANT CHANGE UP

## 2021-08-17 ENCOUNTER — APPOINTMENT (OUTPATIENT)
Dept: UROLOGY | Facility: CLINIC | Age: 26
End: 2021-08-17
Payer: MEDICAID

## 2021-08-17 VITALS — TEMPERATURE: 97.9 F

## 2021-08-17 PROCEDURE — 99214 OFFICE O/P EST MOD 30 MIN: CPT

## 2021-08-18 LAB
APPEARANCE: ABNORMAL
BACTERIA: ABNORMAL
BILIRUBIN URINE: NEGATIVE
BLOOD URINE: NEGATIVE
COLOR: YELLOW
GLUCOSE QUALITATIVE U: NEGATIVE
HYALINE CASTS: 2 /LPF
KETONES URINE: NEGATIVE
LEUKOCYTE ESTERASE URINE: NEGATIVE
MICROSCOPIC-UA: NORMAL
NITRITE URINE: NEGATIVE
PH URINE: 6.5
PROTEIN URINE: NORMAL
RED BLOOD CELLS URINE: 2 /HPF
SPECIFIC GRAVITY URINE: 1.02
SQUAMOUS EPITHELIAL CELLS: 26 /HPF
URINE CYTOLOGY: NORMAL
UROBILINOGEN URINE: NORMAL
WHITE BLOOD CELLS URINE: 2 /HPF

## 2021-08-19 LAB — BACTERIA UR CULT: NORMAL

## 2021-08-26 NOTE — ASSESSMENT
[FreeTextEntry1] : Reviewed outside records on [facility] \par Urine culture negative. \par \par Microhematuria:\par Discussed the differential diagnosis of hematuria including benign and malignant pathology- including but not limited to nephrolithiasis, bladder stone, urinary tract infection, glomerular disease, renal cancer, bladder cancer. \par Also discussed the chance that workup will not reveal a source for the bleeding. The patient understands that the hematuria could be from an upper tract (kidney or ureter) or lower tract (bladder, urethra) and that workup includes imaging and direct visualization of all of these.\par \par Will get Urinalysis, Urine culture and Urine cytology. \par Will get Renal and Bladder Ultrasound.\par \par Return to clinic for next available Cystoscopy.

## 2021-08-26 NOTE — ED ADULT TRIAGE NOTE - WEIGHT IN LBS
Additional Notes: Patient consent was obtained to proceed with the visit and recommended plan of care after discussion of all risks and benefits, including the risks of COVID-19 exposure. Detail Level: Simple 143.9

## 2021-08-26 NOTE — HISTORY OF PRESENT ILLNESS
[FreeTextEntry1] : 27 yo female presents for hematuria. \par Went to  ED with vomiting, back pain and vomiting bile and blood. \par Started on Antibiotics, better. \par Was told has microhematuria. Last period was for 8 days Period for 8 days 2 weeks ago. \par Denies any difficulty with urination. \par Denies dysuria, fever, chills or rigors. \par Non smoker. \par \par Initially seen for ureteral stone?\par Went to [facility] ED with severe left flank pain and vomiting. \par Had Ultrasound was told has ureteral stone. \par Asked to see Urology. \par Continued to have off and on left flank pain, at times intense. \par Denied dysuria, hematuria, nausea, vomiting, fever, chills or rigors. \par No prior personal history or family history of kidney stone.

## 2021-08-30 ENCOUNTER — TRANSCRIPTION ENCOUNTER (OUTPATIENT)
Age: 26
End: 2021-08-30

## 2021-09-13 ENCOUNTER — APPOINTMENT (OUTPATIENT)
Dept: ORTHOPEDIC SURGERY | Facility: CLINIC | Age: 26
End: 2021-09-13
Payer: MEDICAID

## 2021-09-13 PROCEDURE — 99024 POSTOP FOLLOW-UP VISIT: CPT

## 2021-09-13 PROCEDURE — 99213 OFFICE O/P EST LOW 20 MIN: CPT

## 2021-09-13 NOTE — HISTORY OF PRESENT ILLNESS
[FreeTextEntry1] : 9/13/2021: DAVID CHO is a 26 year old female presenting for a follow-up evaluation of left ankle pain. The patient’s pain is noted to be a the same as her last evaluation. Patient is  s/p Left ankle arthroscopy with extensive debridement and synovectomy, left ankle lateral ligament stabilization via Brostrom procedure DOS 10/20/2020. She c/o cracking noises with pain localized widespread throughout the ankle. The patient has been attending physical therapy for this issue. She has no other complaints at this time.	\par \par 7/16/2021: DAVID CHO is a 26 year old female who presents for follow-up evaluation of left ankle pain. Patient is  s/p Left ankle arthroscopy with extensive debridement and synovectomy, left ankle lateral ligament stabilization via Brostrom procedure DOS 10/20/2020. She states the cortisone 4/16/2021 last visit gave her relief but the pain recently returned. She localizes the pain at the lateral aspect of the ankle. She describes it as pressure pain. She states a few days ago she misstepped on the stairs and may have landed on her left ankle.

## 2021-09-13 NOTE — DISCUSSION/SUMMARY
[de-identified] : Today I had a lengthy discussion with the patient regarding their left ankle pain. I have addressed all the patient's concerns surrounding the pathology of their condition. No new imaging was obtained today. At this time, I recommend the patient hold off on physical therapy until MRI results are reviewed. She should continue with RICE therapy and anti-inflammatories as needed for pain management and inflammation. \par \par At this time I would like to obtain advanced imaging of the patient's left ankle. An MRI was ordered so I can find out more about the etiology of the patient's condition. The patient should follow up with the office after obtaining the MRI. The patient understood and verbally agreed to the treatment plan. All of their questions were answered and they were satisfied with the visit. The patient should call the office if they have any questions or experience worsening symptoms.

## 2021-09-13 NOTE — PHYSICAL EXAM
[de-identified] : General: Alert and oriented x3. In no acute distress. Pleasant in nature with a normal affect. No apparent respiratory distress.\par \par Left Ankle\par Skin: Clean, dry, intact. Healed incision, portals. \par Inspection: No obvious malalignment, no swelling, no effusion; no lymphadenopathy\par Pulses: 2+ DP/PT pulses\par ROM: 10 degrees of dorsiflexion, 40 degrees of plantarflexion, 10 degrees of subtalar motion\par Tenderness: +distal fibula, peroneals. No tenderness over the lateral malleolus, no CFL/ATFL/PTFL pain. No medial malleolus pain, no deltoid ligament pain. No proximal fibular pain. No heel pain.\par Stability: Negative anterior/posterior drawer.\par Strength: 5/5 TA/GS/EHL\par Neuro: In tact to light touch throughout\par Additional tests: Negative Quintanilla's test, Negative syndesmosis squeeze test.  [de-identified] : No new imaging obtained today.

## 2021-09-15 ENCOUNTER — APPOINTMENT (OUTPATIENT)
Dept: UROLOGY | Facility: CLINIC | Age: 26
End: 2021-09-15
Payer: MEDICAID

## 2021-09-15 ENCOUNTER — RX RENEWAL (OUTPATIENT)
Age: 26
End: 2021-09-15

## 2021-09-15 DIAGNOSIS — R31.29 OTHER MICROSCOPIC HEMATURIA: ICD-10-CM

## 2021-09-15 PROCEDURE — 52000 CYSTOURETHROSCOPY: CPT

## 2021-09-15 RX ORDER — ONDANSETRON 8 MG/1
8 TABLET ORAL 3 TIMES DAILY
Qty: 12 | Refills: 1 | Status: ACTIVE | COMMUNITY
Start: 2021-06-09 | End: 1900-01-01

## 2021-09-17 ENCOUNTER — APPOINTMENT (OUTPATIENT)
Dept: UROLOGY | Facility: CLINIC | Age: 26
End: 2021-09-17

## 2021-09-20 ENCOUNTER — RX RENEWAL (OUTPATIENT)
Age: 26
End: 2021-09-20

## 2021-09-26 ENCOUNTER — APPOINTMENT (OUTPATIENT)
Dept: MRI IMAGING | Facility: CLINIC | Age: 26
End: 2021-09-26
Payer: MEDICAID

## 2021-09-26 ENCOUNTER — OUTPATIENT (OUTPATIENT)
Dept: OUTPATIENT SERVICES | Facility: HOSPITAL | Age: 26
LOS: 1 days | End: 2021-09-26
Payer: MEDICAID

## 2021-09-26 DIAGNOSIS — M89.9 DISORDER OF BONE, UNSPECIFIED: ICD-10-CM

## 2021-09-26 PROCEDURE — 73721 MRI JNT OF LWR EXTRE W/O DYE: CPT | Mod: 26,LT

## 2021-09-26 PROCEDURE — 73721 MRI JNT OF LWR EXTRE W/O DYE: CPT

## 2021-09-30 ENCOUNTER — APPOINTMENT (OUTPATIENT)
Dept: ORTHOPEDIC SURGERY | Facility: CLINIC | Age: 26
End: 2021-09-30
Payer: MEDICAID

## 2021-09-30 PROCEDURE — 20610 DRAIN/INJ JOINT/BURSA W/O US: CPT | Mod: LT

## 2021-09-30 PROCEDURE — 99214 OFFICE O/P EST MOD 30 MIN: CPT | Mod: 25

## 2021-09-30 NOTE — PHYSICAL EXAM
[de-identified] : General: Alert and oriented x3. In no acute distress. Pleasant in nature with a normal affect. No apparent respiratory distress.\par \par Left Ankle\par Skin: Clean, dry, intact. Healed incision, portals. \par Inspection: No obvious malalignment, no swelling, no effusion; no lymphadenopathy\par Pulses: 2+ DP/PT pulses\par ROM: 10 degrees of dorsiflexion, 40 degrees of plantarflexion, 10 degrees of subtalar motion\par Tenderness: +distal fibula, + peroneals. No tenderness over the lateral malleolus, no CFL/ATFL/PTFL pain. No medial malleolus pain, no deltoid ligament pain. No proximal fibular pain. No heel pain.\par Stability: Negative anterior/posterior drawer.\par Strength: 5/5 TA/GS/EHL\par Neuro: In tact to light touch throughout\par Additional tests: Negative Quintanilla's test, Negative syndesmosis squeeze test.  [de-identified] : MRI of the left ankle obtained at [facility] on 9/26/2021, results reviewed with the patient today, 9/30/2021: Evidence of fluid in the joint space. Osteochondral talar lesion present. No evidence of any fractures. The previous anchors are in stable alignment. \par \par The official radiologist report was unable to be read at this time. We are currently awaiting the results.

## 2021-09-30 NOTE — PROCEDURE
[FreeTextEntry1] : Left Ankle Cortisone Injection\par \par The risks and benefits of the injection were reviewed with the patient today in office and all their questions were answered. The injection site was the medial aspect of the left ankle with the patient lying down. Prior to giving the injection the injection site was prepped with chloraprep and a sterile field was created. Sterile technique was carried out throughout the procedure. After verbal consent from the patient we went ahead and injected the left ankle today with 1 ml 80 mg Depo-Medrol, 1 ml 1% lidocaine and 1 ml of .25% Bupivacaine for a total of 3 ml with a 25 arti 1.5"" needle. The medication was placed into the left ankle without complication. Post injection the patient reported no pain, had a normal gait and good motion of the left ankle. The patient denied numbness and tingling going down their ankle. There were no complications during the procedure.		\par

## 2021-09-30 NOTE — DISCUSSION/SUMMARY
[de-identified] : Today I had a lengthy discussion with the patient regarding their left ankle pain. I have addressed all the patient's concerns surrounding the pathology of their condition. MRI results were reviewed with the patient today in the clinic, however the official read was unavailable at this time. Further, we discussed nonoperative treatment options in the office with regards to physical therapy and cortisone injections. At this time, I am recommending that the patient continue with conservative management for symptomatic relief. I would like the patient to continue with physical therapy for the left ankle at the beginning of next week. \par \par A discussion was had about a possible cortisone injection for the left ankle. The patient would like to move forward with the procedure. The injection was administered in the office today. The patient tolerated the procedure well with no resistance. 	\par \par Continue with RICE therapy and anti-inflammatories as needed for pain management and inflammation.\par 	\par The patient understood and verbally agreed to the treatment plan. All of their questions were answered and they were satisfied with the visit. The patient should call the office if they have any questions or experience worsening symptoms. I would like to see the patient back in the office in 6-8 weeks PRN to reassess their condition. 				\par

## 2021-09-30 NOTE — ADDENDUM
[FreeTextEntry1] : I, Delores Avalos, acted solely as a scribe for Dr. Michael Caicedo on this date 09/30/2021.\par \par All medical record entries made by the Scribe were at my, Dr. Michael Caicedo, direction and personally dictated by me on 09/30/2021 . I have reviewed the chart and agree that the record accurately reflects my personal performance of the history, physical exam, assessment and plan. I have also personally directed, reviewed, and agreed with the chart.	\par

## 2021-09-30 NOTE — HISTORY OF PRESENT ILLNESS
[FreeTextEntry1] : 9/30/2021: DAVID CHO is a 26 year old female presenting for a follow-up evaluation of left ankle pain. She is here to discuss MRI results and for further evaluation of the same. The patient c/o pain and cracking sounds localized to the left ankle. She has not attended physical therapy as of her last evaluation. No other complaints.	\par \par 9/13/2021: DAVID CHO is a 26 year old female presenting for a follow-up evaluation of left ankle pain. The patient’s pain is noted to be a the same as her last evaluation. Patient is  s/p Left ankle arthroscopy with extensive debridement and synovectomy, left ankle lateral ligament stabilization via Brostrom procedure DOS 10/20/2020. She c/o cracking noises with pain localized widespread throughout the ankle. The patient has been attending physical therapy for this issue. She has no other complaints at this time.	\par \par 7/16/2021: DAVID CHO is a 26 year old female who presents for follow-up evaluation of left ankle pain. Patient is  s/p Left ankle arthroscopy with extensive debridement and synovectomy, left ankle lateral ligament stabilization via Brostrom procedure DOS 10/20/2020. She states the cortisone 4/16/2021 last visit gave her relief but the pain recently returned. She localizes the pain at the lateral aspect of the ankle. She describes it as pressure pain. She states a few days ago she misstepped on the stairs and may have landed on her left ankle.

## 2021-10-13 ENCOUNTER — RX RENEWAL (OUTPATIENT)
Age: 26
End: 2021-10-13

## 2021-10-13 RX ORDER — TAMSULOSIN HYDROCHLORIDE 0.4 MG/1
0.4 CAPSULE ORAL
Qty: 30 | Refills: 0 | Status: DISCONTINUED | COMMUNITY
Start: 2021-06-09 | End: 2021-10-13

## 2021-10-13 RX ORDER — NITROFURANTOIN (MONOHYDRATE/MACROCRYSTALS) 25; 75 MG/1; MG/1
100 CAPSULE ORAL
Qty: 15 | Refills: 5 | Status: ACTIVE | COMMUNITY
Start: 2021-07-16 | End: 1900-01-01

## 2021-10-18 ENCOUNTER — APPOINTMENT (OUTPATIENT)
Age: 26
End: 2021-10-18
Payer: MEDICAID

## 2021-10-18 PROCEDURE — 99212 OFFICE O/P EST SF 10 MIN: CPT

## 2021-10-18 NOTE — DISCUSSION/SUMMARY
[de-identified] : At this time the patient will continue with outpatient physical therapy working on strength of the ankle.  I gave her a printout of her most recent MRI report for her physical therapist to look at.  She will continue to work on strength of the ankle.  We will see her back here on an as-needed basis.  All of her questions were answered.

## 2021-10-18 NOTE — PHYSICAL EXAM
[de-identified] : General: Alert and oriented x3. In no acute distress. Pleasant in nature with a normal affect. No apparent respiratory distress.\par \par Left Ankle\par Skin: Clean, dry, intact. Healed incision, portals. \par Inspection: No obvious malalignment, no swelling, no effusion; no lymphadenopathy\par Pulses: 2+ DP/PT pulses\par ROM: 10 degrees of dorsiflexion, 40 degrees of plantarflexion, 10 degrees of subtalar motion\par Tenderness: +distal fibula, + peroneals. No tenderness over the lateral malleolus, no CFL/ATFL/PTFL pain. No medial malleolus pain, no deltoid ligament pain. No proximal fibular pain. No heel pain.\par Stability: Negative anterior/posterior drawer.\par Strength: 5/5 TA/GS/EHL\par Neuro: In tact to light touch throughout\par Additional tests: Negative Quintanilla's test, Negative syndesmosis squeeze test.  [de-identified] : MRI of the left ankle obtained at [facility] on 9/26/2021, results reviewed:  Evidence of fluid in the joint space. Osteochondral talar lesion present. No evidence of any fractures. The previous anchors are in stable alignment. \par \par The official radiologist report was unable to be read at this time. We are currently awaiting the results.

## 2021-10-18 NOTE — ED STATDOCS - CHPI ED TIMING
Pt mom called stating that her daughter sukhdev is pregnant and she is bleeding heavily. Pt mom voiced that pt thinks she is having a miscarriage. Pt says when she coughs she is bleeding heavily. Pt directed to ER for evaluation. KENYON   sudden onset

## 2021-10-18 NOTE — HISTORY OF PRESENT ILLNESS
[FreeTextEntry1] : 10/18/21: The patient is here for follow-up of her left ankle.  She continues to have pain over the lateral aspect of the ankle.  She does state that the cortisone injection from her previous visit did help with the pain.  She wants to continue with physical therapy.  She is also here to get a printout of her MRI to take to her physical therapist.  She has no other complaints.  Her pain scale today is a 3 out of 10.\par \par 9/30/2021: DAVID CHO is a 26 year old female presenting for a follow-up evaluation of left ankle pain. She is here to discuss MRI results and for further evaluation of the same. The patient c/o pain and cracking sounds localized to the left ankle. She has not attended physical therapy as of her last evaluation. No other complaints.	\par \par 9/13/2021: DAVID CHO is a 26 year old female presenting for a follow-up evaluation of left ankle pain. The patient’s pain is noted to be a the same as her last evaluation. Patient is  s/p Left ankle arthroscopy with extensive debridement and synovectomy, left ankle lateral ligament stabilization via Brostrom procedure DOS 10/20/2020. She c/o cracking noises with pain localized widespread throughout the ankle. The patient has been attending physical therapy for this issue. She has no other complaints at this time.	\par \par 7/16/2021: ADVID CHO is a 26 year old female who presents for follow-up evaluation of left ankle pain. Patient is  s/p Left ankle arthroscopy with extensive debridement and synovectomy, left ankle lateral ligament stabilization via Brostrom procedure DOS 10/20/2020. She states the cortisone 4/16/2021 last visit gave her relief but the pain recently returned. She localizes the pain at the lateral aspect of the ankle. She describes it as pressure pain. She states a few days ago she misstepped on the stairs and may have landed on her left ankle.

## 2021-12-08 NOTE — ED ADULT NURSE NOTE - MODE OF DISCHARGE
Hypercholesterolemia Monitoring: I explained this is common when taking isotretinoin. We will monitor closely. Ambulatory

## 2021-12-27 ENCOUNTER — RX RENEWAL (OUTPATIENT)
Age: 26
End: 2021-12-27

## 2021-12-30 ENCOUNTER — EMERGENCY (EMERGENCY)
Facility: HOSPITAL | Age: 26
LOS: 1 days | Discharge: HOME | End: 2021-12-30
Attending: EMERGENCY MEDICINE | Admitting: EMERGENCY MEDICINE
Payer: MEDICAID

## 2021-12-30 VITALS
SYSTOLIC BLOOD PRESSURE: 111 MMHG | RESPIRATION RATE: 16 BRPM | TEMPERATURE: 99 F | DIASTOLIC BLOOD PRESSURE: 72 MMHG | WEIGHT: 153 LBS | HEIGHT: 64 IN | OXYGEN SATURATION: 99 % | HEART RATE: 88 BPM

## 2021-12-30 VITALS
SYSTOLIC BLOOD PRESSURE: 121 MMHG | DIASTOLIC BLOOD PRESSURE: 86 MMHG | HEART RATE: 81 BPM | TEMPERATURE: 98 F | OXYGEN SATURATION: 99 % | RESPIRATION RATE: 18 BRPM

## 2021-12-30 LAB
ALBUMIN SERPL ELPH-MCNC: 3.8 G/DL — SIGNIFICANT CHANGE UP (ref 3.3–5)
ALP SERPL-CCNC: 70 U/L — SIGNIFICANT CHANGE UP (ref 30–120)
ALT FLD-CCNC: 26 U/L DA — SIGNIFICANT CHANGE UP (ref 10–60)
ANION GAP SERPL CALC-SCNC: 8 MMOL/L — SIGNIFICANT CHANGE UP (ref 5–17)
APPEARANCE UR: CLEAR — SIGNIFICANT CHANGE UP
AST SERPL-CCNC: 24 U/L — SIGNIFICANT CHANGE UP (ref 10–40)
BASOPHILS # BLD AUTO: 0.05 K/UL — SIGNIFICANT CHANGE UP (ref 0–0.2)
BASOPHILS NFR BLD AUTO: 0.6 % — SIGNIFICANT CHANGE UP (ref 0–2)
BILIRUB SERPL-MCNC: 0.4 MG/DL — SIGNIFICANT CHANGE UP (ref 0.2–1.2)
BILIRUB UR-MCNC: NEGATIVE — SIGNIFICANT CHANGE UP
BUN SERPL-MCNC: 5 MG/DL — LOW (ref 7–23)
CALCIUM SERPL-MCNC: 9 MG/DL — SIGNIFICANT CHANGE UP (ref 8.4–10.5)
CHLORIDE SERPL-SCNC: 104 MMOL/L — SIGNIFICANT CHANGE UP (ref 96–108)
CO2 SERPL-SCNC: 24 MMOL/L — SIGNIFICANT CHANGE UP (ref 22–31)
COLOR SPEC: YELLOW — SIGNIFICANT CHANGE UP
CREAT SERPL-MCNC: 0.6 MG/DL — SIGNIFICANT CHANGE UP (ref 0.5–1.3)
DIFF PNL FLD: ABNORMAL
EOSINOPHIL # BLD AUTO: 0.06 K/UL — SIGNIFICANT CHANGE UP (ref 0–0.5)
EOSINOPHIL NFR BLD AUTO: 0.7 % — SIGNIFICANT CHANGE UP (ref 0–6)
FLUAV AG NPH QL: DETECTED
FLUBV AG NPH QL: SIGNIFICANT CHANGE UP
GLUCOSE SERPL-MCNC: 95 MG/DL — SIGNIFICANT CHANGE UP (ref 70–99)
GLUCOSE UR QL: NEGATIVE MG/DL — SIGNIFICANT CHANGE UP
HCG SERPL-ACNC: <1 MIU/ML — SIGNIFICANT CHANGE UP
HCG UR QL: NEGATIVE — SIGNIFICANT CHANGE UP
HCT VFR BLD CALC: 36.2 % — SIGNIFICANT CHANGE UP (ref 34.5–45)
HGB BLD-MCNC: 12.1 G/DL — SIGNIFICANT CHANGE UP (ref 11.5–15.5)
IMM GRANULOCYTES NFR BLD AUTO: 0.2 % — SIGNIFICANT CHANGE UP (ref 0–1.5)
KETONES UR-MCNC: ABNORMAL
LEUKOCYTE ESTERASE UR-ACNC: ABNORMAL
LIDOCAIN IGE QN: 76 U/L — SIGNIFICANT CHANGE UP (ref 73–393)
LYMPHOCYTES # BLD AUTO: 1.62 K/UL — SIGNIFICANT CHANGE UP (ref 1–3.3)
LYMPHOCYTES # BLD AUTO: 19.1 % — SIGNIFICANT CHANGE UP (ref 13–44)
MCHC RBC-ENTMCNC: 28 PG — SIGNIFICANT CHANGE UP (ref 27–34)
MCHC RBC-ENTMCNC: 33.4 GM/DL — SIGNIFICANT CHANGE UP (ref 32–36)
MCV RBC AUTO: 83.8 FL — SIGNIFICANT CHANGE UP (ref 80–100)
MONOCYTES # BLD AUTO: 0.29 K/UL — SIGNIFICANT CHANGE UP (ref 0–0.9)
MONOCYTES NFR BLD AUTO: 3.4 % — SIGNIFICANT CHANGE UP (ref 2–14)
NEUTROPHILS # BLD AUTO: 6.43 K/UL — SIGNIFICANT CHANGE UP (ref 1.8–7.4)
NEUTROPHILS NFR BLD AUTO: 76 % — SIGNIFICANT CHANGE UP (ref 43–77)
NITRITE UR-MCNC: NEGATIVE — SIGNIFICANT CHANGE UP
NRBC # BLD: 0 /100 WBCS — SIGNIFICANT CHANGE UP (ref 0–0)
PH UR: 6 — SIGNIFICANT CHANGE UP (ref 5–8)
PLATELET # BLD AUTO: 335 K/UL — SIGNIFICANT CHANGE UP (ref 150–400)
POTASSIUM SERPL-MCNC: 4.4 MMOL/L — SIGNIFICANT CHANGE UP (ref 3.5–5.3)
POTASSIUM SERPL-SCNC: 4.4 MMOL/L — SIGNIFICANT CHANGE UP (ref 3.5–5.3)
PROT SERPL-MCNC: 7.9 G/DL — SIGNIFICANT CHANGE UP (ref 6–8.3)
PROT UR-MCNC: NEGATIVE MG/DL — SIGNIFICANT CHANGE UP
RBC # BLD: 4.32 M/UL — SIGNIFICANT CHANGE UP (ref 3.8–5.2)
RBC # FLD: 14.2 % — SIGNIFICANT CHANGE UP (ref 10.3–14.5)
RSV RNA NPH QL NAA+NON-PROBE: SIGNIFICANT CHANGE UP
SARS-COV-2 RNA SPEC QL NAA+PROBE: SIGNIFICANT CHANGE UP
SODIUM SERPL-SCNC: 136 MMOL/L — SIGNIFICANT CHANGE UP (ref 135–145)
SP GR SPEC: 1.01 — SIGNIFICANT CHANGE UP (ref 1.01–1.02)
UROBILINOGEN FLD QL: NEGATIVE MG/DL — SIGNIFICANT CHANGE UP
WBC # BLD: 8.47 K/UL — SIGNIFICANT CHANGE UP (ref 3.8–10.5)
WBC # FLD AUTO: 8.47 K/UL — SIGNIFICANT CHANGE UP (ref 3.8–10.5)

## 2021-12-30 PROCEDURE — 80053 COMPREHEN METABOLIC PANEL: CPT

## 2021-12-30 PROCEDURE — 36415 COLL VENOUS BLD VENIPUNCTURE: CPT

## 2021-12-30 PROCEDURE — 81001 URINALYSIS AUTO W/SCOPE: CPT

## 2021-12-30 PROCEDURE — 96361 HYDRATE IV INFUSION ADD-ON: CPT

## 2021-12-30 PROCEDURE — 96375 TX/PRO/DX INJ NEW DRUG ADDON: CPT

## 2021-12-30 PROCEDURE — 96374 THER/PROPH/DIAG INJ IV PUSH: CPT | Mod: XU

## 2021-12-30 PROCEDURE — 83690 ASSAY OF LIPASE: CPT

## 2021-12-30 PROCEDURE — 84702 CHORIONIC GONADOTROPIN TEST: CPT

## 2021-12-30 PROCEDURE — 87637 SARSCOV2&INF A&B&RSV AMP PRB: CPT

## 2021-12-30 PROCEDURE — 99284 EMERGENCY DEPT VISIT MOD MDM: CPT | Mod: 25

## 2021-12-30 PROCEDURE — 85025 COMPLETE CBC W/AUTO DIFF WBC: CPT

## 2021-12-30 PROCEDURE — 99284 EMERGENCY DEPT VISIT MOD MDM: CPT

## 2021-12-30 PROCEDURE — 81025 URINE PREGNANCY TEST: CPT

## 2021-12-30 PROCEDURE — 74177 CT ABD & PELVIS W/CONTRAST: CPT | Mod: 26,MA

## 2021-12-30 PROCEDURE — 74177 CT ABD & PELVIS W/CONTRAST: CPT | Mod: MA

## 2021-12-30 RX ORDER — SODIUM CHLORIDE 9 MG/ML
1000 INJECTION INTRAMUSCULAR; INTRAVENOUS; SUBCUTANEOUS ONCE
Refills: 0 | Status: COMPLETED | OUTPATIENT
Start: 2021-12-30 | End: 2021-12-30

## 2021-12-30 RX ORDER — ONDANSETRON 8 MG/1
1 TABLET, FILM COATED ORAL
Qty: 12 | Refills: 0
Start: 2021-12-30 | End: 2022-01-02

## 2021-12-30 RX ORDER — FAMOTIDINE 10 MG/ML
20 INJECTION INTRAVENOUS ONCE
Refills: 0 | Status: COMPLETED | OUTPATIENT
Start: 2021-12-30 | End: 2021-12-30

## 2021-12-30 RX ORDER — ONDANSETRON 8 MG/1
4 TABLET, FILM COATED ORAL ONCE
Refills: 0 | Status: COMPLETED | OUTPATIENT
Start: 2021-12-30 | End: 2021-12-30

## 2021-12-30 RX ADMIN — SODIUM CHLORIDE 1000 MILLILITER(S): 9 INJECTION INTRAMUSCULAR; INTRAVENOUS; SUBCUTANEOUS at 17:19

## 2021-12-30 RX ADMIN — FAMOTIDINE 20 MILLIGRAM(S): 10 INJECTION INTRAVENOUS at 14:43

## 2021-12-30 RX ADMIN — SODIUM CHLORIDE 1000 MILLILITER(S): 9 INJECTION INTRAMUSCULAR; INTRAVENOUS; SUBCUTANEOUS at 19:00

## 2021-12-30 RX ADMIN — SODIUM CHLORIDE 1000 MILLILITER(S): 9 INJECTION INTRAMUSCULAR; INTRAVENOUS; SUBCUTANEOUS at 14:42

## 2021-12-30 RX ADMIN — SODIUM CHLORIDE 1000 MILLILITER(S): 9 INJECTION INTRAMUSCULAR; INTRAVENOUS; SUBCUTANEOUS at 15:42

## 2021-12-30 RX ADMIN — ONDANSETRON 4 MILLIGRAM(S): 8 TABLET, FILM COATED ORAL at 14:42

## 2021-12-30 RX ADMIN — Medication 75 MILLIGRAM(S): at 17:19

## 2021-12-30 NOTE — ED PROVIDER NOTE - PROGRESS NOTE DETAILS
Dustin JOHNSON for attending Dr. Melissa: 27 y/o female with no significant PMHx presents to the ED c/o flu-like symptoms x several days. Pt also has diarrhea. Pt received the 2nd dose of the Pfizer vaccine last week. Denies SOB, CP, abd pain, or other symptoms. No other complaints at this time.     Physical Exam: VSS, afebrile, in NAD, HENMT: normal, heart & lungs normal, abd: soft & nontender, extremities: FROMI & no edema.     Impression: flu-like symptoms.    Plan: flu swab, COVID swab, and labs. Reevaluated patient at bedside.  Patient feeling much improved.  Discussed the results of all diagnostic testing in ED and copies of all reports given. Advised will rx tamiflu and zofran, increase fluids, f/u pcp.  An opportunity to ask questions was given.  Discussed the importance of prompt, close medical follow-up.  Patient will return with any changes, concerns or persistent / worsening symptoms.  Understanding of all instructions verbalized.

## 2021-12-30 NOTE — ED ADULT TRIAGE NOTE - CHIEF COMPLAINT QUOTE
" I got my 2nd Pfizer vaccine 12/27, I started to have chills/body aches/ cough 12/28, I vomited a lot today, my stomach hurts and I have diarrhea. I feel weak "

## 2021-12-30 NOTE — ED PROVIDER NOTE - CLINICAL SUMMARY MEDICAL DECISION MAKING FREE TEXT BOX
27 y/o F with no pmhx presents with c/o abdominal pain, vomiting and diarrhea today. States that she had fever, chills and fatigue from 12/22-12/25, did not get tested and then felt better, had her second pfizer vaccination on 12/27 and the next day had a cough which has improved but started having diarrhea, vomiting and mild abdominal pain today. Pt is not vaccinated for flu. Denies SOB, CP, hx of abdominal surgeries, dysuria, body aches, recent travel. Pt had +covid contacts last week. PE: as above A/P: will get labs, UA, flu/covid swab, ct abdomen/pelvis, IVF/zofran/pepcid, reassess

## 2021-12-30 NOTE — ED PROVIDER NOTE - NSFOLLOWUPINSTRUCTIONS_ED_ALL_ED_FT
Follow up with your PMD in 1-2 days for re-evaluation, ongoing care and treatment. Rest, drink plenty of fluids, tylenol or motrin as needed for fever/pain, zofran as directed for nausea and tamiflu as prescribed. If having worsening of symptoms or other related symptoms, RETURN TO THE ER IMMEDIATELY.     Influenza, Adult      Influenza, also called "the flu," is a viral infection that mainly affects the respiratory tract. This includes the lungs, nose, and throat. The flu spreads easily from person to person (is contagious). It causes common cold symptoms, along with high fever and body aches.      What are the causes?  This condition is caused by the influenza virus. You can get the virus by:  •Breathing in droplets that are in the air from an infected person's cough or sneeze.      •Touching something that has the virus on it (has been contaminated) and then touching your mouth, nose, or eyes.        What increases the risk?  The following factors may make you more likely to get the flu:  •Not washing or sanitizing your hands often.      •Having close contact with many people during cold and flu season.      •Touching your mouth, eyes, or nose without first washing or sanitizing your hands.      •Not getting an annual flu shot.    You may have a higher risk for the flu, including serious problems, such as a lung infection (pneumonia), if you:  •Are older than 65.      •Are pregnant.      •Have a weakened disease-fighting system (immune system). This includes people who have HIV or AIDS, are on chemotherapy, or are taking medicines that reduce (suppress) the immune system.      •Have a long-term (chronic) illness, such as heart disease, kidney disease, diabetes, or lung disease.      •Have a liver disorder.      •Are severely overweight (morbidly obese).      •Have anemia.      •Have asthma.        What are the signs or symptoms?  Symptoms of this condition usually begin suddenly and last 4–14 days. These may include:  •Fever and chills.      •Headaches, body aches, or muscle aches.      •Sore throat.      •Cough.      •Runny or stuffy (congested) nose.      •Chest discomfort.      •Poor appetite.      •Weakness or fatigue.      •Dizziness.      •Nausea or vomiting.        How is this diagnosed?  This condition may be diagnosed based on:  •Your symptoms and medical history.      •A physical exam.       •Swabbing your nose or throat and testing the fluid for the influenza virus.        How is this treated?    If the flu is diagnosed early, you can be treated with antiviral medicine that is given by mouth (orally) or through an IV. This can help reduce how severe the illness is and how long it lasts.  Taking care of yourself at home can help relieve symptoms. Your health care provider may recommend:  •Taking over-the-counter medicines.      •Drinking plenty of fluids.      In many cases, the flu goes away on its own. If you have severe symptoms or complications, you may be treated in a hospital.      Follow these instructions at home:    Activity     •Rest as needed and get plenty of sleep.      •Stay home from work or school as told by your health care provider. Unless you are visiting your health care provider, avoid leaving home until your fever has been gone for 24 hours without taking medicine.      Eating and drinking     •Take an oral rehydration solution (ORS). This is a drink that is sold at pharmacies and retail stores.      •Drink enough fluid to keep your urine pale yellow.      •Drink clear fluids in small amounts as you are able. Clear fluids include water, ice chips, fruit juice mixed with water, and low-calorie sports drinks.      •Eat bland, easy-to-digest foods in small amounts as you are able. These foods include bananas, applesauce, rice, lean meats, toast, and crackers.      •Avoid drinking fluids that contain a lot of sugar or caffeine, such as energy drinks, regular sports drinks, and soda.      •Avoid alcohol.      •Avoid spicy or fatty foods.        General instructions                   •Take over-the-counter and prescription medicines only as told by your health care provider.    •Use a cool mist humidifier to add humidity to the air in your home. This can make it easier to breathe.  •When using a cool mist humidifier, clean it daily. Empty the water and replace it with clean water.        •Cover your mouth and nose when you cough or sneeze.      •Wash your hands with soap and water often and for at least 20 seconds, especially after you cough or sneeze. If soap and water are not available, use alcohol-based hand .      •Keep all follow-up visits. This is important.        How is this prevented?     •Get an annual flu shot. This is usually available in late summer, fall, or winter. Ask your health care provider when you should get your flu shot.      •Avoid contact with people who are sick during cold and flu season. This is generally fall and winter.        Contact a health care provider if:    •You develop new symptoms.    •You have:  •Chest pain.      •Diarrhea.      •A fever.        •Your cough gets worse.      •You produce more mucus.      •You feel nauseous or you vomit.        Get help right away if you:    •Develop shortness of breath or have difficulty breathing.      •Have skin or nails that turn a bluish color.      •Have severe pain or stiffness in your neck.      •Develop a sudden headache or sudden pain in your face or ear.      •Cannot eat or drink without vomiting.      These symptoms may represent a serious problem that is an emergency. Do not wait to see if the symptoms will go away. Get medical help right away. Call your local emergency services (911 in the [place] Do not drive yourself to the hospital.       Summary    •Influenza, also called "the flu," is a viral infection that primarily affects your respiratory tract.      •Symptoms of the flu usually begin suddenly and last 4–14 days.      •Getting an annual flu shot is the best way to prevent getting the flu.      •Stay home from work or school as told by your health care provider. Unless you are visiting your health care provider, avoid leaving home until your fever has been gone for 24 hours without taking medicine.      •Keep all follow-up visits. This is important.      This information is not intended to replace advice given to you by your health care provider. Make sure you discuss any questions you have with your health care provider.

## 2021-12-30 NOTE — ED ADULT NURSE NOTE - OBJECTIVE STATEMENT
26 YOF A&OX3 with no pertinent pmh presents to ED for cough. pt states got covid booster last week and has been feeling well since then. pt states has fevers and chills last week but vomiting and diarrhea today. pt rates abdominal discomfort 5/10. pt denies sob, chest pain, headaches, dizziness, blurry vision. safety maintained.

## 2021-12-30 NOTE — ED PROVIDER NOTE - OBJECTIVE STATEMENT
25 y/o F with no pmhx presents with c/o abdominal pain, vomiting and diarrhea today. States that she had fever, chills and fatigue from 12/22-12/25, did not get tested and then felt better, had her second pfizer vaccination on 12/27 and the next day had a cough which has improved but started having diarrhea, vomiting and mild abdominal pain today. Pt is not vaccinated for flu. Denies SOB, CP, hx of abdominal surgeries, dysuria, body aches, recent travel. Pt had +covid contacts last week.

## 2021-12-30 NOTE — ED ADULT NURSE NOTE - HIV OFFER
11/14/2020        To Whom it may Concern:    Sandy Morales had a PPD (tuberculin skin test) placed on the right forearm on 11/19/2020 .  The results were read on 11/21/2020 at 1258 pm by Jesús Cespedes PA-C    RESULTS:  0 mm induration noted    Signed,      Jesús Cespedes PA-C    [address] [facility] [id number]  [phone]   Previously Declined (within the last year)

## 2021-12-30 NOTE — ED ADULT NURSE NOTE - NSIMPLEMENTINTERV_GEN_ALL_ED
Implemented All Universal Safety Interventions:  [place] to call system. Call bell, personal items and telephone within reach. Instruct patient to call for assistance. Room bathroom lighting operational. Non-slip footwear when patient is off stretcher. Physically safe environment: no spills, clutter or unnecessary equipment. Stretcher in lowest position, wheels locked, appropriate side rails in place.

## 2022-03-18 ENCOUNTER — APPOINTMENT (OUTPATIENT)
Age: 27
End: 2022-03-18
Payer: MEDICAID

## 2022-03-18 PROCEDURE — 99214 OFFICE O/P EST MOD 30 MIN: CPT

## 2022-03-18 PROCEDURE — 73610 X-RAY EXAM OF ANKLE: CPT | Mod: LT
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
## 2022-03-18 NOTE — DISCUSSION/SUMMARY
[de-identified] : Today I had a lengthy discussion with the patient regarding their left ankle injury. I have addressed all the patient's concerns surrounding the pathology of their condition. XR imaging was completed in office today and results were reviewed with the patient. At this time I would like to obtain advanced imaging of the patient's left ankle to evaluate the surgical site and the peroneals. An MRI was ordered so I can find out more about the etiology of the patient's condition. The patient should follow up with the office after obtaining the MRI. In the interim, I recommend that the patient perform a gentle home exercise program for her lower extremities. The patient understood and verbally agreed to the treatment plan. All of their questions were answered and they were satisfied with the visit. The patient should call the office if they have any questions or experience worsening symptoms.

## 2022-03-18 NOTE — ADDENDUM
[FreeTextEntry1] : I, Delores Avalos, acted solely as a scribe for Dr. Michael Caicedo on this date 03/18/2022.\par \par All medical record entries made by the Scribe were at my, Dr. Michael Caicedo, direction and personally dictated by me on 03/18/2022 . I have reviewed the chart and agree that the record accurately reflects my personal performance of the history, physical exam, assessment and plan. I have also personally directed, reviewed, and agreed with the chart.	\par

## 2022-03-18 NOTE — PHYSICAL EXAM
[de-identified] : General: Alert and oriented x3. In no acute distress. Pleasant in nature with a normal affect. No apparent respiratory distress.\par \par Left Ankle\par Skin: Clean, dry, intact. Healed portal incisions.. Lateral incision healed. Small cutaneous nodule. \par Inspection: No obvious malalignment, no swelling, no effusion; no lymphadenopathy\par Pulses: 2+ DP/PT pulses\par ROM: 10 degrees of dorsiflexion, 40 degrees of plantarflexion, 10 degrees of subtalar motion\par Tenderness: +distal fibula, + peroneals. No tenderness over the lateral malleolus, no CFL/ATFL/PTFL pain. No medial malleolus pain, no deltoid ligament pain. No proximal fibular pain. No heel pain.\par Stability: Negative anterior/posterior drawer.\par Strength: 5/5 TA/GS/EHL\par Neuro: In tact to light touch throughout\par Additional tests: Negative Quintanilla's test, Negative syndesmosis squeeze test.  [de-identified] : 3V of the left ankle were ordered, obtained, and reviewed by me today, 03/18/2022, revealed: No acute fractures. \par

## 2022-03-29 ENCOUNTER — APPOINTMENT (OUTPATIENT)
Dept: MRI IMAGING | Facility: CLINIC | Age: 27
End: 2022-03-29
Payer: MEDICAID

## 2022-03-29 ENCOUNTER — OUTPATIENT (OUTPATIENT)
Dept: OUTPATIENT SERVICES | Facility: HOSPITAL | Age: 27
LOS: 1 days | End: 2022-03-29
Payer: MEDICAID

## 2022-03-29 DIAGNOSIS — S99.912S UNSPECIFIED INJURY OF LEFT ANKLE, SEQUELA: ICD-10-CM

## 2022-03-29 DIAGNOSIS — M89.9 DISORDER OF BONE, UNSPECIFIED: ICD-10-CM

## 2022-03-29 DIAGNOSIS — M25.572 PAIN IN LEFT ANKLE AND JOINTS OF LEFT FOOT: ICD-10-CM

## 2022-03-29 PROCEDURE — 73721 MRI JNT OF LWR EXTRE W/O DYE: CPT

## 2022-03-29 PROCEDURE — 73721 MRI JNT OF LWR EXTRE W/O DYE: CPT | Mod: 26,LT

## 2022-04-13 ENCOUNTER — APPOINTMENT (OUTPATIENT)
Dept: ORTHOPEDIC SURGERY | Facility: CLINIC | Age: 27
End: 2022-04-13
Payer: MEDICAID

## 2022-04-13 DIAGNOSIS — S99.912S UNSPECIFIED INJURY OF LEFT ANKLE, SEQUELA: ICD-10-CM

## 2022-04-13 PROCEDURE — 99214 OFFICE O/P EST MOD 30 MIN: CPT

## 2022-04-13 NOTE — DISCUSSION/SUMMARY
[de-identified] : Today I had a lengthy discussion with the patient regarding their left ankle pain. I have addressed all the patient's concerns surrounding the pathology of their condition. MRI results were reviewed with the patient today in the office. I recommend that the patient utilize Voltaren gel topically. If the Voltaren gel could not be obtained, Icy Hot, Biofreeze, or Bengay can be utilized instead. I recommend that the patient utilize Lidocaine gel topically. She should apply the topical gels separately. I recommend the patient undergo a course of physical therapy for the left ankle  2-3 times a week for a total of 8-12 weeks. A prescription was given for the physical therapy today. The patient understood and verbally agreed to the treatment plan. All of their questions were answered and they were satisfied with the visit. The patient should call the office if they have any questions or experience worsening symptoms. I would like to see the patient back in the office in 3 months to reassess their condition. 				\par

## 2022-04-13 NOTE — PHYSICAL EXAM
[de-identified] : General: Alert and oriented x3. In no acute distress. Pleasant in nature with a normal affect. No apparent respiratory distress.\par \par Left Ankle Exam\par Skin: Clean, dry, intact. Healed portal incisions.. Lateral incision healed. Small cutaneous nodule. \par Inspection: No obvious malalignment, no swelling, no effusion; no lymphadenopathy\par Pulses: 2+ DP/PT pulses\par ROM: 10 degrees of dorsiflexion, 40 degrees of plantarflexion, 10 degrees of subtalar motion\par Tenderness: + distal fibula, + peroneals. No tenderness over the lateral malleolus, no CFL/ATFL/PTFL pain. No medial malleolus pain, no deltoid ligament pain. No proximal fibular pain. No heel pain.\par Stability: Negative anterior/posterior drawer.\par Strength: 5/5 TA/GS/EHL\par Neuro: In tact to light touch throughout\par Additional tests: Negative Quintanilla's test, Negative syndesmosis squeeze test.  [de-identified] : EXAM: [id number] - MR ANKLE LT - ORDERED BY: JT DICKENS\par \par \par PROCEDURE DATE: 03/29/2022\par \par \par \par INTERPRETATION: EXAMINATION: MR ANKLE LEFT\par \par CLINICAL INDICATION:s/p arthroscopy with lateral ligament stabilization. Concern for ligament re-tear.\par \par COMPARISON: 9/26/2021 and 9/22/2020 MRI left ankle\par \par TECHNIQUE: Multiplanar, multi-sequence MRI of the left ankle was performed without intravenous contrast.\par \par INTERPRETATION:\par \par Localizer: No additional findings.\par \par Joint space: Trace tibiotalar effusion.\par \par Bones and articular cartilage: There is no acute fracture. Redemonstrated is a tiny osteochondral injury of the lateral talar dome with very subtle bone marrow edema, unchanged from previous MRI.\par \par Tendons and bursae: There is trace peroneal tenosynovitis. There is trace tibialis posterior tenosynovitis. The flexor, extensor, peroneal, and Achilles tendons are intact. There is no abnormal bursal distension\par \par Ligaments: There has been prior left ankle lateral ligament stabilization via Brostrom procedure with fibular anchors for ATFL and PTFL, with intact ligament repair. There is thickening of the calcaneofibular ligament consistent with previous injury, as before. The deltoid and spring ligaments are intact.\par \par Plantar fascia: The plantar fascia is normal in signal and thickness.\par \par \par IMPRESSION:\par 1. Prior left ankle lateral ligament stabilization via Brostrom procedure, with intact ligament repair.\par \par 2. Trace peroneal tenosynovitis. Trace tibialis posterior tenosynovitis.\par \par 3. Redemonstrated is a tiny osteochondral injury of the lateral talar dome. Trace tibiotalar effusion.\par \par --- End of Report ---\par \par \par \par \par SHLOMIT A GOLDBERG-STEIN MD; Attending Radiologist\par This document has been electronically signed. Apr 4 2022 3:52PM\par

## 2022-04-13 NOTE — ADDENDUM
[FreeTextEntry1] : I, Delores Avalos, acted solely as a scribe for Dr. Michael Caicedo on this date 04/13/2022.\par \par All medical record entries made by the Scribe were at my, Dr. Michael Caicedo, direction and personally dictated by me on 04/13/2022 . I have reviewed the chart and agree that the record accurately reflects my personal performance of the history, physical exam, assessment and plan. I have also personally directed, reviewed, and agreed with the chart.	\par
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
## 2022-04-29 ENCOUNTER — APPOINTMENT (OUTPATIENT)
Dept: GASTROENTEROLOGY | Facility: CLINIC | Age: 27
End: 2022-04-29
Payer: MEDICAID

## 2022-04-29 VITALS
BODY MASS INDEX: 23.39 KG/M2 | HEIGHT: 64 IN | HEART RATE: 77 BPM | DIASTOLIC BLOOD PRESSURE: 71 MMHG | WEIGHT: 137 LBS | SYSTOLIC BLOOD PRESSURE: 99 MMHG

## 2022-04-29 DIAGNOSIS — R19.8 OTHER SPECIFIED SYMPTOMS AND SIGNS INVOLVING THE DIGESTIVE SYSTEM AND ABDOMEN: ICD-10-CM

## 2022-04-29 PROCEDURE — 99214 OFFICE O/P EST MOD 30 MIN: CPT

## 2022-04-30 NOTE — ASSESSMENT
[FreeTextEntry1] : 27 F presenting for eval of abdominal pain, cramping and bloating after eating and are accompanied by loose BMs. Some of these symptoms started around the time her job became more stressful. Uncertain of any specific exacerbating factors. ?IBS. Will start hyoscyamine, and keep food journal. Will obtain abdominal/pelvic US for lower abdominal pain on exam. To f/u in 1 month. All questions answered. \par \par Discussed with Dr. Ching. \par

## 2022-04-30 NOTE — PHYSICAL EXAM
[General Appearance - Alert] : alert [General Appearance - In No Acute Distress] : in no acute distress [Sclera] : the sclera and conjunctiva were normal [PERRL With Normal Accommodation] : pupils were equal in size, round, and reactive to light [Extraocular Movements] : extraocular movements were intact [Auscultation Breath Sounds / Voice Sounds] : lungs were clear to auscultation bilaterally [Heart Rate And Rhythm] : heart rate was normal and rhythm regular [Heart Sounds] : normal S1 and S2 [Heart Sounds Gallop] : no gallops [Murmurs] : no murmurs [Heart Sounds Pericardial Friction Rub] : no pericardial rub [Bowel Sounds] : normal bowel sounds [Abdomen Soft] : soft [Abdomen Mass (___ Cm)] : no abdominal mass palpated [Abnormal Walk] : normal gait [Nail Clubbing] : no clubbing  or cyanosis of the fingernails [Musculoskeletal - Swelling] : no joint swelling seen [Motor Tone] : muscle strength and tone were normal [Skin Color & Pigmentation] : normal skin color and pigmentation [Skin Turgor] : normal skin turgor [] : no rash [Oriented To Time, Place, And Person] : oriented to person, place, and time [Impaired Insight] : insight and judgment were intact [Affect] : the affect was normal [FreeTextEntry1] : tender to deep palpation lower abdomen

## 2022-04-30 NOTE — HISTORY OF PRESENT ILLNESS
[FreeTextEntry1] : 27 F presenting for eval of abdominal pain, cramping and bloating after eating. Reports symptoms have been going on for some time, and are accompanied by frequent episodes of loose stool. Attributes some of these symptoms due to recent new job stressors. Uncertain any specific exacerbating factors. SYmptoms are so "debilatating at times" that she feels she can not eat. Denies chest pain, shortness of breath, nausea, vomiting, constipation, melena, hematochezia, unintentional weight changes, fevers, chills.

## 2022-05-02 ENCOUNTER — APPOINTMENT (OUTPATIENT)
Dept: ULTRASOUND IMAGING | Facility: CLINIC | Age: 27
End: 2022-05-02
Payer: MEDICAID

## 2022-05-02 ENCOUNTER — OUTPATIENT (OUTPATIENT)
Dept: OUTPATIENT SERVICES | Facility: HOSPITAL | Age: 27
LOS: 1 days | End: 2022-05-02
Payer: MEDICAID

## 2022-05-02 ENCOUNTER — RESULT REVIEW (OUTPATIENT)
Age: 27
End: 2022-05-02

## 2022-05-02 ENCOUNTER — APPOINTMENT (OUTPATIENT)
Age: 27
End: 2022-05-02

## 2022-05-02 DIAGNOSIS — R10.9 UNSPECIFIED ABDOMINAL PAIN: ICD-10-CM

## 2022-05-02 PROCEDURE — 76830 TRANSVAGINAL US NON-OB: CPT | Mod: 26

## 2022-05-02 PROCEDURE — 76700 US EXAM ABDOM COMPLETE: CPT

## 2022-05-02 PROCEDURE — 76856 US EXAM PELVIC COMPLETE: CPT

## 2022-05-02 PROCEDURE — 76856 US EXAM PELVIC COMPLETE: CPT | Mod: 26

## 2022-05-02 PROCEDURE — 76700 US EXAM ABDOM COMPLETE: CPT | Mod: 26

## 2022-05-02 PROCEDURE — 76830 TRANSVAGINAL US NON-OB: CPT

## 2022-05-10 ENCOUNTER — EMERGENCY (EMERGENCY)
Facility: HOSPITAL | Age: 27
LOS: 1 days | Discharge: HOME | End: 2022-05-10
Attending: EMERGENCY MEDICINE | Admitting: EMERGENCY MEDICINE
Payer: MEDICAID

## 2022-05-10 VITALS
HEART RATE: 66 BPM | RESPIRATION RATE: 19 BRPM | SYSTOLIC BLOOD PRESSURE: 108 MMHG | OXYGEN SATURATION: 99 % | TEMPERATURE: 99 F | DIASTOLIC BLOOD PRESSURE: 64 MMHG

## 2022-05-10 VITALS
HEART RATE: 68 BPM | OXYGEN SATURATION: 99 % | HEIGHT: 64 IN | TEMPERATURE: 100 F | DIASTOLIC BLOOD PRESSURE: 67 MMHG | SYSTOLIC BLOOD PRESSURE: 110 MMHG | RESPIRATION RATE: 18 BRPM

## 2022-05-10 LAB
HCG UR QL: NEGATIVE — SIGNIFICANT CHANGE UP
SARS-COV-2 RNA SPEC QL NAA+PROBE: SIGNIFICANT CHANGE UP

## 2022-05-10 PROCEDURE — 71045 X-RAY EXAM CHEST 1 VIEW: CPT | Mod: 26

## 2022-05-10 PROCEDURE — 81025 URINE PREGNANCY TEST: CPT

## 2022-05-10 PROCEDURE — 71045 X-RAY EXAM CHEST 1 VIEW: CPT

## 2022-05-10 PROCEDURE — 99284 EMERGENCY DEPT VISIT MOD MDM: CPT

## 2022-05-10 PROCEDURE — 99283 EMERGENCY DEPT VISIT LOW MDM: CPT | Mod: 25

## 2022-05-10 PROCEDURE — 87635 SARS-COV-2 COVID-19 AMP PRB: CPT

## 2022-05-10 NOTE — ED PROVIDER NOTE - ENMT, MLM
Airway patent, Mouth with normal mucosa. Throat has no vesicles, no oropharyngeal exudates and uvula is midline. + nasal congestion

## 2022-05-10 NOTE — ED ADULT NURSE NOTE - OBJECTIVE STATEMENT
patient has c/o bodyache and fever since this morning, didn't take any tylenol, Pt is in no acute distress. ambulates to void, will continue to monitor.

## 2022-05-10 NOTE — ED PROVIDER NOTE - PATIENT PORTAL LINK FT
You can access the FollowMyHealth Patient Portal offered by [facility] by registering at the following website: [email or web address]. By joining [facility]’s FollowMyHealth portal, you will also be able to view your health information using other applications (apps) compatible with our system.

## 2022-05-10 NOTE — ED PROVIDER NOTE - CLINICAL SUMMARY MEDICAL DECISION MAKING FREE TEXT BOX
26 y/o female presents to the ED c/o fatigue, runny nose, subjective fever since this morning. Pt relates she had a few non-productive coughs today. No headache, chest pain, SOB, abdominal pain, or rash.    Plan for UCG, CXR and Covid-19 swab.

## 2022-05-10 NOTE — ED PROVIDER NOTE - OBJECTIVE STATEMENT
26 yo female with no significant pmh presents to the ED c/o nasal congestion, feeling hot/cold, cough, fatigue since this morning. 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 Denies chest pain, sob, abd pain, N/V/D, urinary sxs, flank pain. + covid vaccine x 2.

## 2022-06-02 ENCOUNTER — EMERGENCY (EMERGENCY)
Facility: HOSPITAL | Age: 27
LOS: 1 days | Discharge: HOME | End: 2022-06-02
Attending: EMERGENCY MEDICINE | Admitting: EMERGENCY MEDICINE
Payer: MEDICAID

## 2022-06-02 VITALS
SYSTOLIC BLOOD PRESSURE: 112 MMHG | RESPIRATION RATE: 18 BRPM | TEMPERATURE: 98 F | HEART RATE: 99 BPM | OXYGEN SATURATION: 100 % | DIASTOLIC BLOOD PRESSURE: 58 MMHG

## 2022-06-02 VITALS
HEIGHT: 64 IN | WEIGHT: 146.61 LBS | RESPIRATION RATE: 18 BRPM | TEMPERATURE: 98 F | DIASTOLIC BLOOD PRESSURE: 60 MMHG | HEART RATE: 106 BPM | SYSTOLIC BLOOD PRESSURE: 94 MMHG | OXYGEN SATURATION: 100 %

## 2022-06-02 LAB
ALBUMIN SERPL ELPH-MCNC: 3.8 G/DL — SIGNIFICANT CHANGE UP (ref 3.3–5)
ALP SERPL-CCNC: 54 U/L — SIGNIFICANT CHANGE UP (ref 30–120)
ALT FLD-CCNC: 20 U/L DA — SIGNIFICANT CHANGE UP (ref 10–60)
ANION GAP SERPL CALC-SCNC: 7 MMOL/L — SIGNIFICANT CHANGE UP (ref 5–17)
AST SERPL-CCNC: 22 U/L — SIGNIFICANT CHANGE UP (ref 10–40)
BASOPHILS # BLD AUTO: 0.03 K/UL — SIGNIFICANT CHANGE UP (ref 0–0.2)
BASOPHILS NFR BLD AUTO: 0.4 % — SIGNIFICANT CHANGE UP (ref 0–2)
BILIRUB SERPL-MCNC: 0.7 MG/DL — SIGNIFICANT CHANGE UP (ref 0.2–1.2)
BUN SERPL-MCNC: 7 MG/DL — SIGNIFICANT CHANGE UP (ref 7–23)
CALCIUM SERPL-MCNC: 8.8 MG/DL — SIGNIFICANT CHANGE UP (ref 8.4–10.5)
CHLORIDE SERPL-SCNC: 104 MMOL/L — SIGNIFICANT CHANGE UP (ref 96–108)
CO2 SERPL-SCNC: 25 MMOL/L — SIGNIFICANT CHANGE UP (ref 22–31)
CREAT SERPL-MCNC: 0.67 MG/DL — SIGNIFICANT CHANGE UP (ref 0.5–1.3)
D DIMER BLD IA.RAPID-MCNC: <150 NG/ML DDU — SIGNIFICANT CHANGE UP
EGFR: 122 ML/MIN/1.73M2 — SIGNIFICANT CHANGE UP
EOSINOPHIL # BLD AUTO: 0.05 K/UL — SIGNIFICANT CHANGE UP (ref 0–0.5)
EOSINOPHIL NFR BLD AUTO: 0.7 % — SIGNIFICANT CHANGE UP (ref 0–6)
FLUAV AG NPH QL: SIGNIFICANT CHANGE UP
FLUBV AG NPH QL: SIGNIFICANT CHANGE UP
GLUCOSE SERPL-MCNC: 84 MG/DL — SIGNIFICANT CHANGE UP (ref 70–99)
HCG UR QL: NEGATIVE — SIGNIFICANT CHANGE UP
HCT VFR BLD CALC: 35 % — SIGNIFICANT CHANGE UP (ref 34.5–45)
HGB BLD-MCNC: 11.9 G/DL — SIGNIFICANT CHANGE UP (ref 11.5–15.5)
IMM GRANULOCYTES NFR BLD AUTO: 0.1 % — SIGNIFICANT CHANGE UP (ref 0–1.5)
LYMPHOCYTES # BLD AUTO: 0.62 K/UL — LOW (ref 1–3.3)
LYMPHOCYTES # BLD AUTO: 8.7 % — LOW (ref 13–44)
MCHC RBC-ENTMCNC: 30.1 PG — SIGNIFICANT CHANGE UP (ref 27–34)
MCHC RBC-ENTMCNC: 34 GM/DL — SIGNIFICANT CHANGE UP (ref 32–36)
MCV RBC AUTO: 88.4 FL — SIGNIFICANT CHANGE UP (ref 80–100)
MONOCYTES # BLD AUTO: 0.27 K/UL — SIGNIFICANT CHANGE UP (ref 0–0.9)
MONOCYTES NFR BLD AUTO: 3.8 % — SIGNIFICANT CHANGE UP (ref 2–14)
NEUTROPHILS # BLD AUTO: 6.14 K/UL — SIGNIFICANT CHANGE UP (ref 1.8–7.4)
NEUTROPHILS NFR BLD AUTO: 86.3 % — HIGH (ref 43–77)
NRBC # BLD: 0 /100 WBCS — SIGNIFICANT CHANGE UP (ref 0–0)
PLATELET # BLD AUTO: 317 K/UL — SIGNIFICANT CHANGE UP (ref 150–400)
POTASSIUM SERPL-MCNC: 3.8 MMOL/L — SIGNIFICANT CHANGE UP (ref 3.5–5.3)
POTASSIUM SERPL-SCNC: 3.8 MMOL/L — SIGNIFICANT CHANGE UP (ref 3.5–5.3)
PROT SERPL-MCNC: 7.1 G/DL — SIGNIFICANT CHANGE UP (ref 6–8.3)
RBC # BLD: 3.96 M/UL — SIGNIFICANT CHANGE UP (ref 3.8–5.2)
RBC # FLD: 12.3 % — SIGNIFICANT CHANGE UP (ref 10.3–14.5)
RSV RNA NPH QL NAA+NON-PROBE: SIGNIFICANT CHANGE UP
SARS-COV-2 RNA SPEC QL NAA+PROBE: SIGNIFICANT CHANGE UP
SODIUM SERPL-SCNC: 136 MMOL/L — SIGNIFICANT CHANGE UP (ref 135–145)
TROPONIN I, HIGH SENSITIVITY RESULT: <4 NG/L — SIGNIFICANT CHANGE UP
WBC # BLD: 7.12 K/UL — SIGNIFICANT CHANGE UP (ref 3.8–10.5)
WBC # FLD AUTO: 7.12 K/UL — SIGNIFICANT CHANGE UP (ref 3.8–10.5)

## 2022-06-02 PROCEDURE — 87637 SARSCOV2&INF A&B&RSV AMP PRB: CPT

## 2022-06-02 PROCEDURE — 71045 X-RAY EXAM CHEST 1 VIEW: CPT

## 2022-06-02 PROCEDURE — 96374 THER/PROPH/DIAG INJ IV PUSH: CPT

## 2022-06-02 PROCEDURE — 85379 FIBRIN DEGRADATION QUANT: CPT

## 2022-06-02 PROCEDURE — 81025 URINE PREGNANCY TEST: CPT

## 2022-06-02 PROCEDURE — 93005 ELECTROCARDIOGRAM TRACING: CPT

## 2022-06-02 PROCEDURE — 71045 X-RAY EXAM CHEST 1 VIEW: CPT | Mod: 26

## 2022-06-02 PROCEDURE — 96361 HYDRATE IV INFUSION ADD-ON: CPT

## 2022-06-02 PROCEDURE — 85025 COMPLETE CBC W/AUTO DIFF WBC: CPT

## 2022-06-02 PROCEDURE — 93010 ELECTROCARDIOGRAM REPORT: CPT

## 2022-06-02 PROCEDURE — 99285 EMERGENCY DEPT VISIT HI MDM: CPT | Mod: 25

## 2022-06-02 PROCEDURE — 80053 COMPREHEN METABOLIC PANEL: CPT

## 2022-06-02 PROCEDURE — 84484 ASSAY OF TROPONIN QUANT: CPT

## 2022-06-02 PROCEDURE — 99285 EMERGENCY DEPT VISIT HI MDM: CPT

## 2022-06-02 PROCEDURE — 36415 COLL VENOUS BLD VENIPUNCTURE: CPT

## 2022-06-02 RX ORDER — LIDOCAINE 4 G/100G
1 CREAM TOPICAL ONCE
Refills: 0 | Status: COMPLETED | OUTPATIENT
Start: 2022-06-02 | End: 2022-06-02

## 2022-06-02 RX ORDER — SODIUM CHLORIDE 9 MG/ML
1000 INJECTION INTRAMUSCULAR; INTRAVENOUS; SUBCUTANEOUS ONCE
Refills: 0 | Status: COMPLETED | OUTPATIENT
Start: 2022-06-02 | End: 2022-06-02

## 2022-06-02 RX ORDER — KETOROLAC TROMETHAMINE 30 MG/ML
30 SYRINGE (ML) INJECTION ONCE
Refills: 0 | Status: DISCONTINUED | OUTPATIENT
Start: 2022-06-02 | End: 2022-06-02

## 2022-06-02 RX ADMIN — LIDOCAINE 1 PATCH: 4 CREAM TOPICAL at 12:18

## 2022-06-02 RX ADMIN — Medication 30 MILLIGRAM(S): at 12:18

## 2022-06-02 RX ADMIN — SODIUM CHLORIDE 1000 MILLILITER(S): 9 INJECTION INTRAMUSCULAR; INTRAVENOUS; SUBCUTANEOUS at 10:52

## 2022-06-02 RX ADMIN — SODIUM CHLORIDE 1000 MILLILITER(S): 9 INJECTION INTRAMUSCULAR; INTRAVENOUS; SUBCUTANEOUS at 11:42

## 2022-06-02 RX ADMIN — Medication 30 MILLIGRAM(S): at 12:42

## 2022-06-02 NOTE — ED PROVIDER NOTE - PATIENT PORTAL LINK FT
You can access the FollowMyHealth Patient Portal offered by [facility] by registering at the following website: [email or web address]. By joining [facility]’s FollowMyHealth portal, you will also be able to view your health information using other applications (apps) compatible with our system.

## 2022-06-02 NOTE — ED PROVIDER NOTE - PROGRESS NOTE DETAILS
Reevaluated patient at bedside.  Patient feeling improved.  Discussed the results of all diagnostic testing in ED and copies of all reports given. chest pain and shortness of breath improved. return precautions explained. will follow up with primary care provider. referral to cardiology also provided.    An opportunity to ask questions was given.  Discussed the importance of prompt, close medical follow-up.  Patient will return with any changes, concerns or persistent / worsening symptoms.  Understanding of all instructions verbalized.

## 2022-06-02 NOTE — ED PROVIDER NOTE - CARE PROVIDER_API CALL
Aniceto Salguero (MD)  Cardiovascular Disease; Internal Medicine  [address]  [place] [address]  Phone: [phone]  Fax: [phone]  Follow Up Time: 1-3 Days

## 2022-06-02 NOTE — ED PROVIDER NOTE - NSFOLLOWUPINSTRUCTIONS_ED_ALL_ED_FT
drink plenty of fluids  rest  tylenol or motrin over the counter for pain  follow up with primary care and cardiology. referral to cardiology provided if needed       Chest Pain    WHAT YOU NEED TO KNOW:    Chest pain can be caused by a range of conditions, from not serious to life-threatening. Chest pain can be a symptom of a digestive problem, such as acid reflux or a stomach ulcer. An anxiety attack or a strong emotion, such as anger, can also cause chest pain. Infection, inflammation, or a fracture in the bones or cartilage in your chest can cause pain or discomfort. Sometimes chest pain or pressure is caused by poor blood flow to your heart (angina). Chest pain may also be caused by life-threatening conditions such as a heart attack or blood clot in your lungs.    DISCHARGE INSTRUCTIONS:    Call your local emergency number (911 in the ) or have someone call if:   •You have any of the following signs of a heart attack: ?Squeezing, pressure, or pain in your chest      ?You may also have any of the following: ?Discomfort or pain in your back, neck, jaw, stomach, or arm      ?Shortness of breath      ?Nausea or vomiting      ?Lightheadedness or a sudden cold sweat            Return to the emergency department if:   •You have chest discomfort that gets worse, even with medicine.      •You cough or vomit blood.      •Your bowel movements are black or bloody.      •You cannot stop vomiting, or it hurts to swallow.      Call your doctor if:   •You have questions or concerns about your condition or care.          Medicines:   •Medicines may be given to treat the cause of your chest pain. Examples include pain medicine, anxiety medicine, or medicines to increase blood flow to your heart.      •Do not take certain medicines without asking your healthcare provider first. These include NSAIDs, herbal or vitamin supplements, and hormones, such as estrogen or progestin.      •Take your medicine as directed. Contact your healthcare provider if you think your medicine is not helping or if you have side effects. Tell him or her if you are allergic to any medicine. Keep a list of the medicines, vitamins, and herbs you take. Include the amounts, and when and why you take them. Bring the list or the pill bottles to follow-up visits. Carry your medicine list with you in case of an emergency.      Healthy living tips: If the cause of your chest pain is known, your healthcare provider will give you specific guidelines to follow. The following are general healthy guidelines:  •Do not smoke. Nicotine and other chemicals in cigarettes and cigars can cause lung and heart damage. Ask your healthcare provider for information if you currently smoke and need help to quit. E-cigarettes or smokeless tobacco still contain nicotine. Talk to your healthcare provider before you use these products.      •Choose a variety of healthy foods as often as possible. Include fresh, frozen, or canned fruits and vegetables. Also include low-fat dairy products, fish, chicken (without skin), and lean meats. Your healthcare provider or a dietitian can help you create meal plans. You may need to avoid certain foods or drinks if your pain is caused by a digestion problem.  Healthy Foods           •Lower your sodium (salt) intake. Limit foods that are high in sodium, such as canned foods, salty snacks, and cold cuts. If you add salt when you cook food, do not add more at the table. Choose low-sodium canned foods as much as possible.             •Drink plenty of water every day. Water helps your body to control your temperature and blood pressure. Ask your healthcare provider how much water you should drink every day.      •Ask about activity. Your healthcare provider will tell you which activities to limit or avoid. Ask when you can drive, return to work, and have sex. Ask about the best exercise plan for you.      •Maintain a healthy weight. Ask your healthcare provider what a healthy weight is for you. Ask him or her to help you create a safe weight loss plan if you are overweight.      •Ask about vaccines you may need. Your healthcare provider can tell you which vaccines you need, and when to get them. The following vaccines help prevent diseases that can become serious for a person with a heart condition:?The influenza (flu) vaccine is given each year. Get a flu vaccine as soon as recommended, usually in September or October.      ?The pneumonia vaccine is usually given every 5 years. Your healthcare provider may recommend the pneumonia vaccine if you are 65 or older.      ?COVID-19 vaccines are given to adults as a shot in 1 or 2 doses. Vaccination is recommended for all adults. A booster (additional) dose is also recommended for all adults. A second booster is recommended for all adults 50 or older and for immunocompromised adults 18 or older. The second booster is also recommended for adults who received the 1-dose vaccine for the first and booster doses. Your healthcare provider can tell you when to get one or both boosters.      Prevent Heart Disease          Follow up with your doctor within 72 hours, or as directed: You may need to return for more tests to find the cause of your chest pain. You may be referred to a specialist, such as a cardiologist or gastroenterologist. Write down your questions so you remember to ask them during your visits.

## 2022-06-02 NOTE — ED PROVIDER NOTE - OBJECTIVE STATEMENT
otherwise healthy 27 year old female presents with chest pain, upper back pain, and shortness of breath that started this am when she woke up. no fevers, but felt chills when she woke up. slight cough started on arrival otherwise healthy 27 year old female presents with chest pain, upper back pain, and shortness of breath that started this am when she woke up. no fevers, but felt chills when she woke up. slight cough started on arrival, dry in nature. slight sore throat, no diff swallowing. no history of DVT or PE. no history of cancer. no recent calf tenderness or swelling. no recent travels. on BCP's. no recent covid exposures of know covid infection   PCP Lis Kumar

## 2022-06-02 NOTE — ED PROVIDER NOTE - CLINICAL SUMMARY MEDICAL DECISION MAKING FREE TEXT BOX
27 year old female presents with chest pain, upper back pain, and shortness of breath that started this am when she woke up. no fevers, but felt chills when she woke up. slight cough started on arrival, dry in nature. slight sore throat, no diff swallowing. no history of DVT or PE. no history of cancer. no recent calf tenderness or swelling. no recent travels. on BCP's. no recent covid exposures of known covid infection   PCP Lis Kumar    VSS Afebrile, NAD  HEENT - clear  PERRL EOMI  Neck supple  lungs clear  Cor S1S2 RR - MGR  Abd soft nontender, no mass or HSM, no rebound  Ext FROM intact, no edema  Neuro Intact, no deficits.  Skin Warm and dry no rash.    Imp Atypical chest pain. Etiology unclear.  Plan Labs, CXR, Covid swab.     I performed a history and physical exam of the patient and discussed their management with the advanced care provider. I reviewed the advanced care provider's note and agree with the documented findings and plan of care. My medical decision making and objective findings are found above.

## 2022-06-06 ENCOUNTER — NON-APPOINTMENT (OUTPATIENT)
Age: 27
End: 2022-06-06

## 2022-06-09 ENCOUNTER — APPOINTMENT (OUTPATIENT)
Dept: ORTHOPEDIC SURGERY | Facility: CLINIC | Age: 27
End: 2022-06-09
Payer: MEDICAID

## 2022-06-09 DIAGNOSIS — S99.912A UNSPECIFIED INJURY OF LEFT ANKLE, INITIAL ENCOUNTER: ICD-10-CM

## 2022-06-09 DIAGNOSIS — M94.9 DISORDER OF BONE, UNSPECIFIED: ICD-10-CM

## 2022-06-09 DIAGNOSIS — M89.9 DISORDER OF BONE, UNSPECIFIED: ICD-10-CM

## 2022-06-09 PROCEDURE — 20605 DRAIN/INJ JOINT/BURSA W/O US: CPT | Mod: LT

## 2022-06-09 PROCEDURE — 99213 OFFICE O/P EST LOW 20 MIN: CPT | Mod: 25

## 2022-06-09 NOTE — HISTORY OF PRESENT ILLNESS
[FreeTextEntry1] : 6/09/2022: The patient is a 27 year old female presenting for a follow-up evaluation of her left ankle. The patient reports increased pain over the ankle with cracking sensations noted. Pain is stated to be worsened with ambulation. The patient cannot attribute their pain to any injury, fall, or trauma. The patient has been attending physical therapy/HEP for this issue. She continues to elevate her ankle without relief. The patient would like to discuss a possible cortisone injection in the office today for relief. She is wearing sneakers and is walking without assistance. No other complaints.

## 2022-06-09 NOTE — PHYSICAL EXAM
[de-identified] : General: Alert and oriented x3. In no acute distress. Pleasant in nature with a normal affect. No apparent respiratory distress.\par \par Left Ankle Exam\par Skin: Clean, dry, intact. Healed portal incisions. Lateral incision healed. Small cutaneous nodule. \par Inspection: Bone prominence to the distal fibula. No obvious malalignment, no swelling, no effusion; no lymphadenopathy\par Pulses: 2+ DP/PT pulses\par ROM: 10 degrees of dorsiflexion, 40 degrees of plantarflexion, 10 degrees of subtalar motion\par Tenderness: + distal fibula, + peroneals. No tenderness over the lateral malleolus, no CFL/ATFL/PTFL pain. No medial malleolus pain, no deltoid ligament pain. No proximal fibular pain. No heel pain.\par Stability: Negative anterior/posterior drawer.\par Strength: 5/5 TA/GS/EHL\par Neuro: In tact to light touch throughout\par Additional tests: Negative Quintanilla's test, Negative syndesmosis squeeze test.  [de-identified] : EXAM: [id number] - MR ANKLE LT - ORDERED BY: JT DICKENS\par \par \par PROCEDURE DATE: 03/29/2022\par \par \par \par INTERPRETATION: EXAMINATION: MR ANKLE LEFT\par \par CLINICAL INDICATION:s/p arthroscopy with lateral ligament stabilization. Concern for ligament re-tear.\par \par COMPARISON: 9/26/2021 and 9/22/2020 MRI left ankle\par \par TECHNIQUE: Multiplanar, multi-sequence MRI of the left ankle was performed without intravenous contrast.\par \par INTERPRETATION:\par \par Localizer: No additional findings.\par \par Joint space: Trace tibiotalar effusion.\par \par Bones and articular cartilage: There is no acute fracture. Redemonstrated is a tiny osteochondral injury of the lateral talar dome with very subtle bone marrow edema, unchanged from previous MRI.\par \par Tendons and bursae: There is trace peroneal tenosynovitis. There is trace tibialis posterior tenosynovitis. The flexor, extensor, peroneal, and Achilles tendons are intact. There is no abnormal bursal distension\par \par Ligaments: There has been prior left ankle lateral ligament stabilization via Brostrom procedure with fibular anchors for ATFL and PTFL, with intact ligament repair. There is thickening of the calcaneofibular ligament consistent with previous injury, as before. The deltoid and spring ligaments are intact.\par \par Plantar fascia: The plantar fascia is normal in signal and thickness.\par \par \par IMPRESSION:\par 1. Prior left ankle lateral ligament stabilization via Brostrom procedure, with intact ligament repair.\par \par 2. Trace peroneal tenosynovitis. Trace tibialis posterior tenosynovitis.\par \par 3. Redemonstrated is a tiny osteochondral injury of the lateral talar dome. Trace tibiotalar effusion.\par \par --- End of Report ---\par \par \par \par \par SHLOMIT A GOLDBERG-STEIN MD; Attending Radiologist\par This document has been electronically signed. Apr 4 2022 3:52PM\par

## 2022-06-09 NOTE — DISCUSSION/SUMMARY
[de-identified] : Today I had a lengthy discussion with the patient regarding their left ankle pain. I have addressed all the patient's concerns surrounding the pathology of their condition. MRI results were reviewed today in the office. A discussion was had about a possible cortisone injection for the left ankle. The patient would like to move forward with the procedure. The injection was administered in the office today. The patient tolerated the procedure well with no resistance. I recommend that the patient utilize Voltaren gel topically. If the Voltaren gel could not be obtained, Icy Hot, Biofreeze, or Bengay can be utilized instead. The patient understood and verbally agreed to the treatment plan. All of their questions were answered and they were satisfied with the visit. The patient should call the office if they have any questions or experience worsening symptoms. I would like to see the patient back in the office in PRN to reassess their condition.

## 2022-06-09 NOTE — PROCEDURE
[FreeTextEntry1] : Left Ankle Cortisone Injection\par The risks and benefits of the injection were reviewed with the patient today in office and all their questions were answered. The injection site was the medial aspect of the left ankle with the patient lying down. Prior to giving the injection the injection site was prepped with chloraprep and a sterile field was created. Sterile technique was carried out throughout the procedure. After verbal consent from the patient we went ahead and injected the left ankle today with 2 ml 40 mg Depo-Medrol, 1 ml 1% lidocaine and 1 ml of .25% Bupivacaine for a total of 4 ml with a 22 arti 1.5"" needle. The medication was placed into the left ankle without complication. Post injection the patient reported no pain, had a normal gait and good motion of the left ankle. The patient denied numbness and tingling going down their ankle. There were no complications during the procedure.		\par

## 2022-06-09 NOTE — ADDENDUM
[FreeTextEntry1] : I, Delorse Avalos, acted solely as a scribe for Dr. Michael Caicedo on this date 06/09/2022.\par \par All medical record entries made by the Scribe were at my, Dr. Michael Caicedo, direction and personally dictated by me on 06/09/2022 . I have reviewed the chart and agree that the record accurately reflects my personal performance of the history, physical exam, assessment and plan. I have also personally directed, reviewed, and agreed with the chart.	\par

## 2022-07-18 ENCOUNTER — EMERGENCY (EMERGENCY)
Facility: HOSPITAL | Age: 27
LOS: 1 days | Discharge: HOME | End: 2022-07-18
Attending: EMERGENCY MEDICINE | Admitting: EMERGENCY MEDICINE
Payer: MEDICAID

## 2022-07-18 VITALS
RESPIRATION RATE: 16 BRPM | SYSTOLIC BLOOD PRESSURE: 104 MMHG | HEIGHT: 64 IN | WEIGHT: 146.61 LBS | HEART RATE: 79 BPM | TEMPERATURE: 99 F | DIASTOLIC BLOOD PRESSURE: 66 MMHG | OXYGEN SATURATION: 99 %

## 2022-07-18 VITALS
SYSTOLIC BLOOD PRESSURE: 110 MMHG | HEART RATE: 74 BPM | RESPIRATION RATE: 16 BRPM | OXYGEN SATURATION: 99 % | TEMPERATURE: 98 F | DIASTOLIC BLOOD PRESSURE: 78 MMHG

## 2022-07-18 PROCEDURE — 99283 EMERGENCY DEPT VISIT LOW MDM: CPT

## 2022-07-18 PROCEDURE — 99283 EMERGENCY DEPT VISIT LOW MDM: CPT | Mod: 25

## 2022-07-18 PROCEDURE — 73610 X-RAY EXAM OF ANKLE: CPT

## 2022-07-18 PROCEDURE — 73610 X-RAY EXAM OF ANKLE: CPT | Mod: 26,RT

## 2022-07-18 RX ORDER — ACETAMINOPHEN 500 MG
975 TABLET ORAL ONCE
Refills: 0 | Status: COMPLETED | OUTPATIENT
Start: 2022-07-18 | End: 2022-07-18

## 2022-07-18 RX ADMIN — Medication 975 MILLIGRAM(S): at 22:05

## 2022-07-18 NOTE — ED ADULT NURSE NOTE - OBJECTIVE STATEMENT
Received a 28 y/o female, came to ED presenting right foot/ankle pain. Pt A&O X43, color appears normal, skin warm and intact. Pt reports twisting right foot today. Denies any numbness/tingling, no deformity, (+)  mild swelling to right ankle. Seen/attended by ED provider with orders made and carried out. Sent for XR as ordered. Acetaminophen 975 mg orally given as needed for pain. Ace bandage applied to right foot. Crutches given with instructions on proper usage provided. Needs attended and provided.

## 2022-07-18 NOTE — ED PROVIDER NOTE - PROGRESS NOTE DETAILS
discussed xr with pt, rn to ace and provide crutches, pt has an orthopedist, will call tomorrow to arrange f/u

## 2022-07-18 NOTE — ED PROVIDER NOTE - OBJECTIVE STATEMENT
27 y.o. F c/o pain right ankle, was walking on uneven ground, right ankle inverted, c/o pain lateral ankle, denies pain foot or knee, denies other injury, pain with movement and weight bearing, occurred just PTA, hasn't taken anything for pain yet. states she is in PT for injury/surgery for left ankle ligamentous injury last year but had been improving, Ortho at [facility]

## 2022-07-18 NOTE — ED PROVIDER NOTE - PATIENT PORTAL LINK FT
You can access the FollowMyHealth Patient Portal offered by [facility] by registering at the following website: [email or web address]. By joining [employer]’s FollowMyHealth portal, you will also be able to view your health information using other applications (apps) compatible with our system.

## 2022-07-18 NOTE — ED PROVIDER NOTE - NSFOLLOWUPINSTRUCTIONS_ED_ALL_ED_FT
Ankle Sprain    Illustration of an ankle sprain caused by a foot turning outward and a foot turning inward.    An ankle sprain is a stretch or tear in a ligament in the ankle. Ligaments are tissues that connect bones to each other.    The two most common types of ankle sprains are:  •Inversion sprain. This happens when the foot turns inward and the ankle rolls outward. It affects the ligament on the outside of the foot (lateral ligament).      •Eversion sprain. This happens when the foot turns outward and the ankle rolls inward. It affects the ligament on the inner side of the foot (medial ligament).        What are the causes?    This condition is often caused by accidentally rolling or twisting the ankle.      What increases the risk?    You are more likely to develop this condition if you play sports.      What are the signs or symptoms?  Comparison of a normal ankle and an ankle that is swollen and bruised.   Symptoms of this condition include:  •Pain in your ankle.      •Swelling.      •Bruising. This may develop right after you sprain your ankle or 1–2 days later.      •Trouble standing or walking, especially when you turn or change directions.        How is this diagnosed?    This condition is diagnosed with:  •A physical exam. During the exam, your health care provider will press on certain parts of your foot and ankle and try to move them in certain ways.      •X-ray imaging. These may be taken to see how severe the sprain is and to check for broken bones.        How is this treated?    This condition may be treated with:  •A brace or splint. This is used to keep the ankle from moving until it heals.      •An elastic bandage. This is used to support the ankle.      •Crutches.      •Pain medicine.      •Surgery. This may be needed if the sprain is severe.      •Physical therapy. This may help to improve the range of motion in the ankle.        Follow these instructions at home:    If you have a brace or a splint:     •Wear the brace or splint as told by your health care provider. Remove it only as told by your health care provider.      •Loosen the brace or splint if your toes tingle, become numb, or turn cold and blue.      •Keep the brace or splint clean.    •If the brace or splint is not waterproof:  •Do not let it get wet.       •Cover it with a watertight covering when you take a bath or a shower.        If you have an elastic bandage (dressing):     •Remove it to shower or bathe.      •Try not to move your ankle much, but wiggle your toes from time to time. This helps to prevent swelling.      •Adjust the dressing to make it more comfortable if it feels too tight.      •Loosen the dressing if you have numbness or tingling in your foot, or if your foot becomes cold and blue.        Managing pain, stiffness, and swelling   A bag of ice on a towel on the skin.   •Take over-the-counter and prescription medicines only as told by your health care provider.      •For 2–3 days, keep your ankle raised (elevated) above the level of your heart as much as possible.    •If directed, put ice on the injured area:  •If you have a removable brace or splint, remove it as told by your health care provider.      •Put ice in a plastic bag.      •Place a towel between your skin and the bag.      •Leave the ice on for 20 minutes, 2–3 times a day.        General instructions     •Rest your ankle.      • Do not use the injured limb to support your body weight until your health care provider says that you can. Use crutches as told by your health care provider.      • Do not use any products that contain nicotine or tobacco, such as cigarettes, e-cigarettes, and chewing tobacco. If you need help quitting, ask your health care provider.      •Keep all follow-up visits as told by your health care provider. This is important.        Contact a health care provider if:    •You have rapidly increasing bruising or swelling.      •Your pain is not relieved with medicine.        Get help right away if:    •Your foot or toes become numb or blue.      •You have severe pain that gets worse.        Summary    •An ankle sprain is a stretch or tear in a ligament in the ankle. Ligaments are tissues that connect bones to each other.      •This condition is often caused by accidentally rolling or twisting the ankle.      •Symptoms include pain, swelling, bruising, and trouble walking.      •To relieve pain and swelling, put ice on the affected ankle, raise your ankle above the level of your heart, and use an elastic bandage.      •Keep all follow-up visits as told by your health care provider. This is important.      This information is not intended to replace advice given to you by your health care provider. Make sure you discuss any questions you have with your health care provider.

## 2022-07-18 NOTE — ED PROVIDER NOTE - MUSCULOSKELETAL, MLM
right LE +ttp/swelling lateral malleolar area, no ttp knee or foot, good ROM, normal distal cap refill/sensation

## 2022-08-17 ENCOUNTER — NON-APPOINTMENT (OUTPATIENT)
Age: 27
End: 2022-08-17

## 2022-08-31 NOTE — ED PROVIDER NOTE - NS ED MD DISPO DISCHARGE CCDA
Detail Level: Detailed Quality 226: Preventive Care And Screening: Tobacco Use: Screening And Cessation Intervention: Patient screened for tobacco use and is an ex/non-smoker Quality 130: Documentation Of Current Medications In The Medical Record: Current Medications Documented Quality 137: Melanoma: Continuity Of Care - Recall System: Recall system not utilized, reason not otherwise specified Patient/Caregiver provided printed discharge information.

## 2022-09-04 ENCOUNTER — NON-APPOINTMENT (OUTPATIENT)
Age: 27
End: 2022-09-04

## 2022-09-20 ENCOUNTER — APPOINTMENT (OUTPATIENT)
Dept: RADIOLOGY | Facility: CLINIC | Age: 27
End: 2022-09-20

## 2022-09-20 ENCOUNTER — OUTPATIENT (OUTPATIENT)
Dept: OUTPATIENT SERVICES | Facility: HOSPITAL | Age: 27
LOS: 1 days | End: 2022-09-20
Payer: MEDICAID

## 2022-09-20 DIAGNOSIS — Z00.8 ENCOUNTER FOR OTHER GENERAL EXAMINATION: ICD-10-CM

## 2022-09-20 PROCEDURE — 72074 X-RAY EXAM THORAC SPINE4/>VW: CPT

## 2022-09-20 PROCEDURE — 72074 X-RAY EXAM THORAC SPINE4/>VW: CPT | Mod: 26

## 2022-09-20 PROCEDURE — 72100 X-RAY EXAM L-S SPINE 2/3 VWS: CPT | Mod: 26

## 2022-09-20 PROCEDURE — 72100 X-RAY EXAM L-S SPINE 2/3 VWS: CPT

## 2022-09-20 PROCEDURE — 72040 X-RAY EXAM NECK SPINE 2-3 VW: CPT

## 2022-09-20 PROCEDURE — 72040 X-RAY EXAM NECK SPINE 2-3 VW: CPT | Mod: 26

## 2022-10-10 NOTE — ED ADULT NURSE NOTE - IS THE PATIENT ABLE TO BE SCREENED?
Patient calling looking for a call back to discuss hives post surgery on 10/5.    Please call patient back - CCR unable to reach Triage.    Yes

## 2022-10-11 NOTE — ED STATDOCS - CHPI ED CONTEXT
Unknown Complex Repair Preamble Text (Leave Blank If You Do Not Want): Extensive wide undermining was performed.

## 2022-10-12 ENCOUNTER — NON-APPOINTMENT (OUTPATIENT)
Age: 27
End: 2022-10-12

## 2022-10-27 ENCOUNTER — NON-APPOINTMENT (OUTPATIENT)
Age: 27
End: 2022-10-27

## 2022-11-21 ENCOUNTER — APPOINTMENT (OUTPATIENT)
Dept: GASTROENTEROLOGY | Facility: CLINIC | Age: 27
End: 2022-11-21

## 2022-11-21 VITALS
HEART RATE: 71 BPM | WEIGHT: 152 LBS | DIASTOLIC BLOOD PRESSURE: 72 MMHG | HEIGHT: 64 IN | SYSTOLIC BLOOD PRESSURE: 105 MMHG | BODY MASS INDEX: 25.95 KG/M2

## 2022-11-21 DIAGNOSIS — R10.13 EPIGASTRIC PAIN: ICD-10-CM

## 2022-11-21 PROCEDURE — 99214 OFFICE O/P EST MOD 30 MIN: CPT

## 2022-11-22 NOTE — HISTORY OF PRESENT ILLNESS
[FreeTextEntry1] : Addie Anderson is a 27 year old female presenting for follow up visit. Was previously seen for likely IBS related abdominal cramping and post prandial bloating, was given hyoscyamine with relief but pt has stopped taking medication so symptoms returned. Reports she had covid several weeks ago, and she lost the medication. Also notes for the last few weeks, she has been experiencing a new sharp intermittent epigastric pain as well as GERD symptoms. Notes the epigastric pain comes on suddenly, unrelieved by OTC medications. Denies nausea, vomiting, dysphagia, or odynophagia.

## 2022-11-22 NOTE — REVIEW OF SYSTEMS
[Abdominal Pain] : abdominal pain [Heartburn] : heartburn [Bloating (gassiness)] : bloating [Negative] : Heme/Lymph [Vomiting] : no vomiting [Constipation] : no constipation [Diarrhea] : no diarrhea [Melena (black stool)] : no melena [Bleeding] : no bleeding [Fecal Incontinence (soiling)] : no fecal incontinence

## 2022-11-22 NOTE — ASSESSMENT
[FreeTextEntry1] : Plan:\par Since pt reports relief of abdominal cramping and post prandial bloating with hyoscyamine previously prescribed, recommend she resume. Informed her prescription sent has refills, instructed her to call pharmacy to renew. For new epigastric pain and GERD, recommend EGD to rule out PUD. Risks versus benefits as well as instructions reviewed, pt agrees. For symptom management, will prescribe omeprazole to be taken daily. Given education regarding diet and lifestyle modifications for GERD. Pt expressed understanding of plan, all questions answered. Discussed with Dr. Ching. I have spent 25 minutes on this encounter.

## 2022-12-13 ENCOUNTER — APPOINTMENT (OUTPATIENT)
Dept: RHEUMATOLOGY | Facility: CLINIC | Age: 27
End: 2022-12-13

## 2022-12-13 VITALS
TEMPERATURE: 97.6 F | WEIGHT: 156 LBS | DIASTOLIC BLOOD PRESSURE: 82 MMHG | OXYGEN SATURATION: 98 % | SYSTOLIC BLOOD PRESSURE: 127 MMHG | RESPIRATION RATE: 16 BRPM | HEART RATE: 82 BPM | BODY MASS INDEX: 26.63 KG/M2 | HEIGHT: 64 IN

## 2022-12-13 DIAGNOSIS — S99.911S UNSPECIFIED INJURY OF RIGHT ANKLE, SEQUELA: ICD-10-CM

## 2022-12-13 PROCEDURE — 99204 OFFICE O/P NEW MOD 45 MIN: CPT

## 2022-12-13 NOTE — HISTORY OF PRESENT ILLNESS
[FreeTextEntry1] : 27-year-old woman, referred for evaluation of joint pain\par \par \par 2020 left ankle surgery\par sprained right ankle when walking\par X-rays were normal\par underwent PT sessions\par follows with ortho\par scheduled for an MRI next week\par \par Chronic low back pain, history of scoliosis\par \par \par Chronic " GI issues"\par Endorses " swelling of the belly"\par scheduled for  EGD to r/o gastritis\par \par

## 2022-12-13 NOTE — ASSESSMENT
[FreeTextEntry1] : #Right ankle pain and swelling, following sprain injury\par Nonhealing as per patient, despite PT sessions\par Scheduled next week for an MRI-asked to fax results for review\par #Chronic low back pain, scoliosis\par Continue with physical therapy\par #Chronic history of GI discomfort /abdominal bloating\par Unclear if she was diagnosed with IBD or IBS for\par To discuss with GI the role of colonoscopy\par \par

## 2022-12-13 NOTE — PHYSICAL EXAM
[General Appearance - Alert] : alert [General Appearance - In No Acute Distress] : in no acute distress [FreeTextEntry1] : Tenderness to palpation over right ankle, no synovitis [Impaired Insight] : insight and judgment were intact

## 2022-12-14 ENCOUNTER — NON-APPOINTMENT (OUTPATIENT)
Age: 27
End: 2022-12-14

## 2023-01-25 ENCOUNTER — APPOINTMENT (OUTPATIENT)
Dept: GASTROENTEROLOGY | Facility: AMBULATORY MEDICAL SERVICES | Age: 28
End: 2023-01-25
Payer: MEDICAID

## 2023-01-25 ENCOUNTER — RESULT REVIEW (OUTPATIENT)
Age: 28
End: 2023-01-25

## 2023-01-25 PROCEDURE — 43239 EGD BIOPSY SINGLE/MULTIPLE: CPT | Mod: GC

## 2023-01-31 RX ORDER — OMEPRAZOLE 40 MG/1
40 CAPSULE, DELAYED RELEASE ORAL
Qty: 90 | Refills: 3 | Status: ACTIVE | COMMUNITY
Start: 2022-11-21 | End: 1900-01-01

## 2023-02-02 ENCOUNTER — EMERGENCY (EMERGENCY)
Facility: HOSPITAL | Age: 28
LOS: 0 days | Discharge: HOME | End: 2023-02-02
Attending: EMERGENCY MEDICINE
Payer: MEDICAID

## 2023-02-02 VITALS
RESPIRATION RATE: 18 BRPM | HEART RATE: 76 BPM | DIASTOLIC BLOOD PRESSURE: 65 MMHG | SYSTOLIC BLOOD PRESSURE: 105 MMHG | OXYGEN SATURATION: 99 % | TEMPERATURE: 99 F

## 2023-02-02 VITALS — HEIGHT: 64 IN | WEIGHT: 149.91 LBS

## 2023-02-02 DIAGNOSIS — M79.672 PAIN IN LEFT FOOT: ICD-10-CM
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
## 2023-02-02 DIAGNOSIS — Z79.82 LONG TERM (CURRENT) USE OF ASPIRIN: ICD-10-CM

## 2023-02-02 DIAGNOSIS — S90.112A CONTUSION OF LEFT GREAT TOE WITHOUT DAMAGE TO NAIL, INITIAL ENCOUNTER: ICD-10-CM

## 2023-02-02 DIAGNOSIS — M25.572 PAIN IN LEFT ANKLE AND JOINTS OF LEFT FOOT: ICD-10-CM
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
## 2023-02-02 DIAGNOSIS — S96.912A STRAIN OF UNSPECIFIED MUSCLE AND TENDON AT ANKLE AND FOOT LEVEL, LEFT FOOT, INITIAL ENCOUNTER: ICD-10-CM

## 2023-02-02 PROCEDURE — 73610 X-RAY EXAM OF ANKLE: CPT | Mod: LT

## 2023-02-02 PROCEDURE — 73630 X-RAY EXAM OF FOOT: CPT | Mod: 26,LT

## 2023-02-02 PROCEDURE — 73630 X-RAY EXAM OF FOOT: CPT | Mod: LT

## 2023-02-02 PROCEDURE — 99284 EMERGENCY DEPT VISIT MOD MDM: CPT

## 2023-02-02 PROCEDURE — 73660 X-RAY EXAM OF TOE(S): CPT | Mod: LT

## 2023-02-02 PROCEDURE — 99284 EMERGENCY DEPT VISIT MOD MDM: CPT | Mod: 25

## 2023-02-02 PROCEDURE — 73610 X-RAY EXAM OF ANKLE: CPT | Mod: 26,LT

## 2023-02-02 RX ORDER — IBUPROFEN 200 MG
600 TABLET ORAL ONCE
Refills: 0 | Status: COMPLETED | OUTPATIENT
Start: 2023-02-02 | End: 2023-02-02

## 2023-02-02 RX ADMIN — Medication 600 MILLIGRAM(S): at 22:58

## 2023-02-02 NOTE — ED STATDOCS - PATIENT PORTAL LINK FT
You can access the FollowMyHealth Patient Portal offered by [facility] by registering at the following website: [email or web address]. By joining [email or web address]’s FollowMyHealth portal, you will also be able to view your health information using other applications (apps) compatible with our system.

## 2023-02-02 NOTE — ED STATDOCS - OBJECTIVE STATEMENT
Pt. is a 27 year old F with PMhx of left ankle surgery in 2020 , hx of right ankle sprain in 2022, presents with left great toe pain, left foot pain and left ankle pain s/p stubbing left great toe into a table leg at home about 1.5 hours ago.  Patient states she was barefoot when she walked into the table.  She states she immediately grabbed her foot and then strained her left ankle.  She did not take any meds for pain prior to arrival.  She is worried she broke her great toe and strained her left ankle by forcibly grabbing foot after the accident.  Pain is dull, pulling throbbing pain at base of left great toe.  Patient states pain worsens with any movement of her left great toe or with ambulating.

## 2023-02-02 NOTE — ED STATDOCS - CLINICAL SUMMARY MEDICAL DECISION MAKING FREE TEXT BOX
Left great toe contusion; possible fracture; will get xray of toe, foot and ankle due to pain in all these joints.      Pain control with ibuprofen and ice and referral to Dr. Caicedo, patients ortho foot specialist.

## 2023-02-02 NOTE — ED STATDOCS - CARE PROVIDER_API CALL
Michael Caicedo (DO)  Orthopaedic Surgery  [address] [place] [address]  Phone: [phone]  Fax: [phone]  Follow Up Time:

## 2023-02-02 NOTE — ED STATDOCS - MUSCULOSKELETAL, MLM
+TTP base of left great toe; no deformity.  neurovasc intact; mild swelling left lateral ankle; no bony tenderness of ankle

## 2023-02-02 NOTE — ED STATDOCS - COVID-19 ORDERING FACILITY
Medicare Annual Wellness Visit  Name: Priscila Giron Date: 2019   MRN: [id number] Sex: Male   Age: 76 y.o. Ethnicity: Non-/Non    : 1953 Race: Liberty Fontenot is here for Medicare AWV    Screenings for behavioral, psychosocial and functional/safety risks, and cognitive dysfunction are all negative except as indicated below. These results, as well as other patient data from the [address] form, are documented in Flowsheets linked to this Encounter. Right flat skin lesion right cheek. Wife wants it checked out    Allergies   Allergen Reactions    Cymbalta [Duloxetine Hcl] Itching     Prior to Visit Medications    Medication Sig Taking? Authorizing Provider   rosuvastatin (CRESTOR) 20 MG tablet Take 1 tablet by mouth daily Yes Jonh Rivas MD   citalopram (CELEXA) 40 MG tablet Take 1 tablet by mouth daily Yes Jonh Rivas MD   esomeprazole ([employer]) 40 MG delayed release capsule Take 1 capsule by mouth daily Yes Jonh Rivas MD   Insulin Syringe-Needle U-100 ([employer] INSULIN SYR 1CC/30G) 30G X /16\" 1 ML MISC 1 each by Does not apply route 2 times daily Yes Jonh Rivas MD   losartan (COZAAR) 25 MG tablet Take 1 tablet by mouth nightly Yes Jonh Rivas MD   insulin 70-30 (NOVOLIN 70/30) (70-30) 100 UNIT per ML injection vial 35 units in am and 25 units in evening Yes Jonh Rivas MD   glucose blood VI test strips (SVETA CONTOUR NEXT TEST) strip 1 each by In Vitro route 2 times daily As needed. Yes Historical Provider, MD   Omega 3-6-9 Fatty Acids (OMEGA 3-6-9 COMPLEX PO) Take by mouth Yes Historical Provider, MD   glucose ([facility] GLUCOSE) 4 G chewable tablet Take 4 tablets by mouth as needed for Low blood sugar  Patient taking differently: Take 4 tablets by mouth as needed for Low blood sugar Yes Jonh Rivas MD   Cinnamon 500 MG CAPS Take 500 mg by mouth 2 times daily.  Yes Historical Provider, MD   Multiple Vitamins-Minerals
[facility]  - [facility] Pandemic Response

## 2023-02-02 NOTE — ED ADULT NURSE NOTE - NS_SISCREENINGSR_GEN_ALL_ED
[Home] : at home, [unfilled] , at the time of the visit. [Medical Office: ([facility])___] : at the medical office located in  [Verbal consent obtained from patient] : the patient, [unfilled] [Moderate] : moderate [Gradual] : gradually [___ Weeks ago] :  [unfilled] weeks ago Negative [Paroxysmal] : paroxysmal [Congestion] : congestion [Wheezing] : wheezing [Cough] : cough [At Night] : at night [OTC Remedies] : OTC remedies [Stable] : stable [Sore Throat] : no sore throat [Anorexia] : no anorexia [Chills] : no chills [Shortness Of Breath] : no shortness of breath [Fatigue] : not fatigue [Earache] : no earache [Headache] : no headache [Fever] : no fever [FreeTextEntry8] : As above. Signs and symptoms consistent with worsening postnasal drip. Patient using anticholinergic nasal spray as previously directed added over-the-counter second-generation antihistamine in the morning. Visited an urgent care in local area for which she received inhaled beta agonist use as needed. When patient feels like he is wheezing which is where beta agonist works.  Patient known COVID antibody negative recently.

## 2023-02-08 ENCOUNTER — APPOINTMENT (OUTPATIENT)
Dept: ORTHOPEDIC SURGERY | Facility: CLINIC | Age: 28
End: 2023-02-08
Payer: MEDICAID

## 2023-02-08 DIAGNOSIS — S99.922A UNSPECIFIED INJURY OF LEFT FOOT, INITIAL ENCOUNTER: ICD-10-CM

## 2023-02-08 DIAGNOSIS — M25.572 PAIN IN LEFT ANKLE AND JOINTS OF LEFT FOOT: ICD-10-CM

## 2023-02-08 PROCEDURE — 99214 OFFICE O/P EST MOD 30 MIN: CPT

## 2023-02-08 NOTE — HISTORY OF PRESENT ILLNESS
[FreeTextEntry1] : The patient is a 27-year-old female who presents with a new injury to her left great toe after she jammed her toe approximately 2 weeks ago.  She went to [facility] day of the injury where they took x-rays which were negative for fractures of the great toe.  She does have some swelling and bruising present of the great toe but she is walking without assistance.  Pain scale has come down since the initial injury.  Pain scale today 2 out of 10.  No other complaints.

## 2023-02-08 NOTE — DISCUSSION/SUMMARY
[de-identified] : I explained to the patient that there is no fracture of the left great toe.  I do believe this is a contusion and will heal conservatively.  She will continue to use proper shoes and supportive shoes with a wider toed shoe box until she feels comfortable putting on a regular comfortable sneaker.  She could resume normal activities as she starts to feel better at her own pace.  I explained to her that a bone contusion can take up to 4 to 6 weeks to fully heal completely.  If it does not heal in 4 to 6 weeks, she should return to office an MRI might be warranted then.  Hold off on advanced imaging now.  She could use over-the-counter NSAIDs and Tylenol for pain.  She can continue with ice and elevation for swelling and bruising.  Follow-up as needed.

## 2023-02-08 NOTE — PHYSICAL EXAM
[de-identified] : Left foot Physical Examination:\par \par General: Alert and oriented x3.  In no acute distress.  Pleasant in nature with a normal affect.  No apparent respiratory distress. \par Erythema, Warmth, Rubor: Negative\par Swelling: Mild swelling and bruising just over the MTPJ joint of the great toe.\par \par ROM Ankle:\par 1. Dorsiflexion: 10 degrees\par 2. Plantarflexion: 40 degrees\par 3. Inversion: 30 degrees\par 4. Eversion: 30 degrees\par \par ROM of digits: Normal\par \par Pes Planus: Negative\par Pes Cavus: Negative\par \par Bunion: Negative\par Stephanie's Bunion (Bunionette): Negative\par Hammer Toe Deformity/Deformities: Negative\par \par Tenderness to Palpation: \par 1. Heel Pain: Negative\par 2. Midfoot Pain: Negative\par 3. First MTP Joint: Negative\par 4. Lis Franc Joint: Negative\par \par Tenderness Metatarsals:\par 1st MT: Negative\par 2nd MT: Negative\par 3rd MT: Negative\par 4th MT: Negative\par 5th MT: Negative\par Base of the 5th MT: Negative\par \par Ligament Pain:\par 1. Lis Franc Ligament: Negative\par 2. Plantar Fascia Ligament: Negative\par \par Strength: \par 5/5 TA/GS/EHL/FHL/EDL/ADD/ABD\par \par Pulses: 2+ DP/PT Pulses\par \par Capillary Refill Toes: <2 seconds\par \par Neuro: Intact motor and sensory throughout\par \par Additional Test:\par 1. Quintanilla's Squeeze Test: Negative\par 2. Calcaneal Squeeze Test: Negative\par \par *Patient has good range of motion of the great toe at the first MTPJ, EHL and FHL are working normal. [de-identified] : ACC: [id number] EXAM: XR ANKLE COMP MIN 3 VIEWS LT ORDERED BY: ZE GROVE\par \par ACC: [id number] EXAM: XR FOOT COMP MIN 3 VIEWS LT ORDERED BY: ZE GROVE\par \par ACC: [id number] EXAM: XR TOE(S) MIN 2 VIEWS LT ORDERED BY: ZE GROVE\par \par PROCEDURE DATE: 02/02/2023\par \par \par \par INTERPRETATION: Clinical history: 27-year-old female, stubbed toe.\par \par Two views of the left hallux, 3 views of the left ankle and left foot demonstrate no fracture, dislocation, degenerative change or radiographic soft tissue abnormality.\par \par IMPRESSION:\par Negative radiographs\par \par --- End of Report ---\par \par \par \par \par \par TRISH BOLAND DO; Attending Radiologist\par This document has been electronically signed. Feb 3 2023 8:07AM

## 2023-02-14 NOTE — ED ADULT NURSE NOTE - CAS DISCH ACCOMP BY
Self Niacinamide Pregnancy And Lactation Text: These medications are considered safe during pregnancy.

## 2023-03-07 ENCOUNTER — APPOINTMENT (OUTPATIENT)
Dept: RHEUMATOLOGY | Facility: CLINIC | Age: 28
End: 2023-03-07
Payer: MEDICAID

## 2023-03-07 VITALS
HEART RATE: 81 BPM | SYSTOLIC BLOOD PRESSURE: 112 MMHG | BODY MASS INDEX: 25.95 KG/M2 | HEIGHT: 64 IN | WEIGHT: 152 LBS | TEMPERATURE: 97.6 F | DIASTOLIC BLOOD PRESSURE: 79 MMHG | RESPIRATION RATE: 16 BRPM | OXYGEN SATURATION: 98 %

## 2023-03-07 DIAGNOSIS — M25.473 EFFUSION, UNSPECIFIED ANKLE: ICD-10-CM

## 2023-03-07 DIAGNOSIS — R10.9 UNSPECIFIED ABDOMINAL PAIN: ICD-10-CM

## 2023-03-07 PROCEDURE — 99214 OFFICE O/P EST MOD 30 MIN: CPT

## 2023-03-08 LAB
ALBUMIN SERPL ELPH-MCNC: 4.8 G/DL
ALP BLD-CCNC: 77 U/L
ALT SERPL-CCNC: 23 U/L
ANION GAP SERPL CALC-SCNC: 13 MMOL/L
AST SERPL-CCNC: 22 U/L
BASOPHILS # BLD AUTO: 0.06 K/UL
BASOPHILS NFR BLD AUTO: 0.9 %
BILIRUB SERPL-MCNC: 0.5 MG/DL
BUN SERPL-MCNC: 9 MG/DL
CALCIUM SERPL-MCNC: 9.5 MG/DL
CHLORIDE SERPL-SCNC: 105 MMOL/L
CO2 SERPL-SCNC: 24 MMOL/L
CREAT SERPL-MCNC: 0.54 MG/DL
CRP SERPL-MCNC: <3 MG/L
EGFR: 129 ML/MIN/1.73M2
EOSINOPHIL # BLD AUTO: 0.07 K/UL
EOSINOPHIL NFR BLD AUTO: 1.1 %
ERYTHROCYTE [SEDIMENTATION RATE] IN BLOOD BY WESTERGREN METHOD: 4 MM/HR
HCT VFR BLD CALC: 40.1 %
HGB BLD-MCNC: 13.4 G/DL
IMM GRANULOCYTES NFR BLD AUTO: 0.3 %
LYMPHOCYTES # BLD AUTO: 2.54 K/UL
LYMPHOCYTES NFR BLD AUTO: 39.4 %
MAN DIFF?: NORMAL
MCHC RBC-ENTMCNC: 29.1 PG
MCHC RBC-ENTMCNC: 33.4 GM/DL
MCV RBC AUTO: 87.2 FL
MONOCYTES # BLD AUTO: 0.39 K/UL
MONOCYTES NFR BLD AUTO: 6 %
NEUTROPHILS # BLD AUTO: 3.37 K/UL
NEUTROPHILS NFR BLD AUTO: 52.3 %
PLATELET # BLD AUTO: 328 K/UL
POTASSIUM SERPL-SCNC: 4.3 MMOL/L
PROT SERPL-MCNC: 7.3 G/DL
RBC # BLD: 4.6 M/UL
RBC # FLD: 14.4 %
SODIUM SERPL-SCNC: 142 MMOL/L
WBC # FLD AUTO: 6.45 K/UL

## 2023-03-09 NOTE — PHYSICAL EXAM
[General Appearance - Alert] : alert [General Appearance - In No Acute Distress] : in no acute distress [Abdomen Soft] : soft [Abdomen Tenderness] : non-tender [FreeTextEntry1] : tenderness to bilateral ankles, no effusion  [Impaired Insight] : insight and judgment were intact

## 2023-03-09 NOTE — HISTORY OF PRESENT ILLNESS
[FreeTextEntry1] : 2020 left ankle surgery\par sprained right ankle when walking\par X-rays were normal\par underwent PT sessions\par follows with ortho\par repeated MRI December 2022, as per patient was told no major findings\par \par Chronic low back pain, history of scoliosis\par \par \par Chronic " GI issues"\par Endorses " swelling of the belly"\par s.p EGD/biopsy , results reviewed today no major findings\par

## 2023-03-27 RX ORDER — HYOSCYAMINE SULFATE 0.12 MG/1
0.12 TABLET, ORALLY DISINTEGRATING ORAL
Qty: 360 | Refills: 3 | Status: ACTIVE | COMMUNITY
Start: 2022-04-29 | End: 1900-01-01

## 2023-04-17 ENCOUNTER — NON-APPOINTMENT (OUTPATIENT)
Age: 28
End: 2023-04-17

## 2023-04-24 ENCOUNTER — NON-APPOINTMENT (OUTPATIENT)
Age: 28
End: 2023-04-24

## 2023-05-05 ENCOUNTER — APPOINTMENT (OUTPATIENT)
Dept: GASTROENTEROLOGY | Facility: CLINIC | Age: 28
End: 2023-05-05
Payer: MEDICAID

## 2023-05-05 VITALS
HEART RATE: 69 BPM | WEIGHT: 147 LBS | DIASTOLIC BLOOD PRESSURE: 75 MMHG | BODY MASS INDEX: 25.1 KG/M2 | HEIGHT: 64 IN | SYSTOLIC BLOOD PRESSURE: 103 MMHG

## 2023-05-05 DIAGNOSIS — K58.9 IRRITABLE BOWEL SYNDROME W/OUT DIARRHEA: ICD-10-CM

## 2023-05-05 PROCEDURE — 99213 OFFICE O/P EST LOW 20 MIN: CPT

## 2023-05-05 NOTE — PHYSICAL EXAM
ACM attempted outreach. Left message. Contact information for call back provided. 3rd attempt at outreach. Care transition completed . [Alert] : alert [Normal Voice/Communication] : normal voice/communication [Healthy Appearing] : healthy appearing [No Acute Distress] : no acute distress [Sclera] : the sclera and conjunctiva were normal [Hearing Threshold Finger Rub Not Orange] : hearing was normal [Normal Lips/Gums] : the lips and gums were normal [Oropharynx] : the oropharynx was normal [Normal Appearance] : the appearance of the neck was normal [No Neck Mass] : no neck mass was observed [No Respiratory Distress] : no respiratory distress [No Acc Muscle Use] : no accessory muscle use [Respiration, Rhythm And Depth] : normal respiratory rhythm and effort [Auscultation Breath Sounds / Voice Sounds] : lungs were clear to auscultation bilaterally [Heart Rate And Rhythm] : heart rate was normal and rhythm regular [Normal S1, S2] : normal S1 and S2 [Murmurs] : no murmurs [Bowel Sounds] : normal bowel sounds [Abdomen Tenderness] : non-tender [No Masses] : no abdominal mass palpated [Abdomen Soft] : soft [] : no hepatosplenomegaly [Oriented To Time, Place, And Person] : oriented to person, place, and time

## 2023-05-05 NOTE — ASSESSMENT
[FreeTextEntry1] : 29yo female with gerd, ibs\par \par continue omeprazole\par continue hyoscyamine prn\par \par since she just feeling better but not yet at baseline, will maintain meds for now\par f/u 3 months

## 2023-05-05 NOTE — HISTORY OF PRESENT ILLNESS
[FreeTextEntry1] : 29yo female with GERD, IBS\par \par Overall she is feeling well\par She is on omeprazole 40mg and hyoscyamine up to tid prn\par She also has started regular exercise and dietary changes\par Also with less bloating

## 2023-05-07 NOTE — ED PROVIDER NOTE - IV ALTEPLASE INCLUSION HIDDEN
Nephrology Consult    Date of Admission: 5/4/2023    Date of consultation: 5/7/2023    Chief complaint:   Chief Complaint   Patient presents with   • Edema 13 yrs or more       HPI:   Patient is a 82-year-old female with history of heart disease and Aortic Stenosis who was admitted to the hospital on 05/04/2023 complaining of shortness of breath, increasing edema and weight.  She has a significant cardiac history with NICMP, HFrEF, s/p BIV PPM, paroxysmal afib s/p AVN ablation, s/p AVR 2012.     Nephrology consult has been requested to evaluate patient's renal function assist in diuresis.    05/06/2023: Alert, Lasix drip 10 milligram/hour, intake 1.1 L, output 1.5 L, weight unchanged from yesterday  05/07/2023: Comfortable on room air, alert, UO 4.6 L, weight is down 2.4 kg from yesterday.  Still very edematous       Subjective:  I was asked to see Carol Nova at the request of Ashley Leyva MD  Patient is a 82 year old female who was admitted with Hypervolemia, unspecified hypervolemia type [E87.70]  Acute on chronic congestive heart failure, unspecified heart failure type (CMD) [I50.9].    Patient Active Problem List    Diagnosis Date Noted   • Type 2 diabetes mellitus with stage 3 chronic kidney disease and hypertension (CMD) 12/14/2022     Priority: Medium     Class: Chronic   • Debility 12/14/2022     Priority: Medium     Class: Present on Admission   • Acute on chronic congestive heart failure, unspecified heart failure type (CMD) 05/04/2023     Priority: Low   • Chronic kidney disease (CKD) stage G3b/A2, moderately decreased glomerular filtration rate (GFR) between 30-44 mL/min/1.73 square meter and albuminuria creatinine ratio between  mg/g (CMD) 04/10/2023     Priority: Low   • CHF (congestive heart failure), NYHA class II, acute, combined (CMD) 03/23/2023     Priority: Low   • Anemia, unspecified 01/17/2023     Priority: Low   • SOB (shortness of breath) 01/16/2023     Priority: Low   •  Polyneuropathy associated with underlying disease (CMD) 01/13/2023     Priority: Low   • Osteoarthritis of left sacroiliac joint (CMD) 01/13/2023     Priority: Low   • Cerebral atrophy (CMD) 01/13/2023     Priority: Low   • Hypercoagulable state due to atrial fibrillation (CMD) 01/13/2023     Priority: Low   • Weakness of left lower extremity 12/04/2022     Priority: Low   • Permanent atrial fibrillation (CMD) 11/22/2022     Priority: Low       Ejection Fraction   Date Value Ref Range Status   12/06/2022 33 % Final   CHADvascs score: 7 (CAD, HTN, age+2, gender, DM, CMP)  Anticoagulation: previously on warfarin - stopped d/t GI bleed and anemia. Currently on Eliquis.   Procedures:s/p AVJ ablation.     • Cardiomyopathy (CMD) 09/06/2022     Priority: Low     Ejection Fraction   Date Value Ref Range Status   12/06/2022 33 % Final          • Left leg pain 09/10/2021     Priority: Low   • S/P TAVR (transcatheter aortic valve replacement) 01/16/2021     Priority: Low     Valve in valve TAVR 1/14/2021     • Tricuspid regurgitation 01/09/2021     Priority: Low   • Mitral regurgitation 01/09/2021     Priority: Low   • Pulmonary hypertension (CMD) 01/09/2021     Priority: Low   • Other specified peripheral vascular diseases (CMD) 09/18/2020     Priority: Low   • NIDIA (generalized anxiety disorder) 06/08/2020     Priority: Low   • Current use of long term anticoagulation- Eliquis 11/09/2018     Priority: Low     Creatinine (mg/dL)   Date Value   12/12/2022 1.19 (H)     GOT/AST (Units/L)   Date Value   12/05/2022 20     GPT/ALT (Units/L)   Date Value   12/05/2022 14          • Allergy to statin medication 10/26/2018     Priority: Low   • Medtronic BIV Pacemaker 08/28/2018     Priority: Low     Original PPM implanted by Dr. River 9/17/12 for sick sinus syndrome.  S/P Upgrade from dual PPM to BIV PPM on 11/22/22 with AVN ablation, in the setting of SSS, recurrent AF, and NICMP.        • High cholesterol 06/21/2018      Priority: Low   • Gastroesophageal reflux disease without esophagitis 04/13/2018     Priority: Low   • AMD (age related macular degeneration) 04/13/2018     Priority: Low   • Meningioma (CMD) 04/13/2018     Priority: Low   • Personal history of colonic polyps 03/20/2007     Priority: Low     Past Medical History:   Diagnosis Date   • Acute on chronic systolic heart failure (CMD) 12/14/2022   • Alcoholism /alcohol abuse    • Anxiety    • Aortic stenosis     S/P AVR   • Atrial fibrillation (CMD)    • Benign positional vertigo    • Bilateral age-related macular degeneration     gets around OK but has difficulty reading   • Bilateral carotid artery stenosis    • Biventricular ICD (implantable cardioverter-defibrillator) in place 12/14/2022   • Colon polyps     tubular adenoma   • Congestive cardiac failure (CMD)    • Coronary artery disease    • Debility 12/14/2022   • Depression    • Diverticulosis    • Frailty 12/14/2022   • Gastroesophageal reflux disease    • GI bleed 08/2017    while on Warfarin   • History of cerebellar stroke    • Hyperlipidemia    • Lung nodule    • Malignant neoplasm of breast (female), unspecified site 2009    Stage I left breast invasive ductal carcinoma, 1.1 cm, ER/IN positive, HER-2/obie negative, 5 lymph nodes negative for metastatic disease, s/p lumpectomy July 20, 2009 with intermediate Onco Dx score.      • Mesenteric artery insufficiency (CMD)    • Mitral regurgitation     mild-moderate   • Murmur, heart    • Neuropathy of both feet     Uses Tylenol   • Non-ischemic cardiomyopathy (CMD) 2020    EF 54%   • Pacemaker 2012    Medtronic   • Pleural effusion on right 05/2020    temporary chest tube   • Pneumonia    • SSS (sick sinus syndrome) (CMD)    • SUPRAVENTRICULAR TACHYCARDIA    • Thyroid nodule    • Type 2 diabetes mellitus with stage 3 chronic kidney disease and hypertension (CMD) 12/14/2022   • Type II or unspecified type diabetes mellitus without mention of complication, not stated  as uncontrolled    • Unspecified essential hypertension    • Uses roller walker    • Wears dentures     Full Upper and Partial Lower   • Wears prescription eyeglasses      Past Surgical History:   Procedure Laterality Date   • Artery surgery  08/22/2012    mesenteric artery ischemia stent placed   • Breast surgery Left 07/20/2009    lumpectomy   • Cardiac catherization  12/09/2016    50% p LAD   • Cardiac catherization  01/11/2021    Stable non-obstructive CAD / Mitral Valve Prolapse / Severe Aortic Valve Stenosis   • Cardiac surgery  08/08/2014    Aortic valve replacement using a 21 mm [employer] Magna Ease bioprosthetic valve (Dr. RUBEN Mccallum)   • Colonoscopy w/ polypectomy  03/2007    polyps (tubular adenoma), diverticulosis   • Colonoscopy w/ polypectomy  08/15/2012    polyps (tubular adenoma), diverticulosis   • Colonoscopy w/ polypectomy  05/16/2017    polyps (tubular adenoma), diverticulosis   • Ct guided chest tube insertion  05/2020    Right pleural effusion   • Ct salivary glands combo Left 1989    Left submandibular gland excision   • Eye surgery Left 10/09/2012    vitrectomy with membrane peel   • Eye surgery Right 11/27/2012    vitrectomy with membrane peel   • Eye surgery Left 07/20/2011    cataract extraction with IOL implant   • Eye surgery Right 10/12/2011    cataract extraction with IOL implant   • Hysterectomy  1980    one ovary left   • Pa pressure sensor implant - cv  08/12/2020    CardioMEMS placement   • Pacemaker implant  09/17/2012    Medtronic for SSS   • Tavr iliofemoral-cv  01/14/2021    #23 mm Medtronic Evolut was placed.  The post valve deployment paravalvular leak was mild. The valve in valve was placed urgently for failed bioprosthetic valve   • Tonsillectomy      with adnoids as a child   • Upgrade of pacemaker system  11/22/2022    Biventricular; MRI Surescan   • Us guide thyroid biopsy       Prior to Admission medications    Medication Sig Start Date End Date Taking?  Authorizing Provider   torsemide (DEMADEX) 20 MG tablet Take 4 tablets by mouth 2 times daily. 5/2/23 5/1/24 Yes Ami Mao MD   potassium CHLORIDE (Klor-Con M) 20 MEQ yeison ER tablet Take 1 tablet by mouth daily.  Patient taking differently: Take 20 mEq by mouth in the morning and 20 mEq in the evening. 4/26/23  Yes Rocky Richmond MD   escitalopram (LEXAPRO) 10 MG tablet Take 1 tablet by mouth daily. 4/10/23  Yes Ashley Leyva MD   dulaglutide (TRULICITY) 3 MG/0.5ML pen-injector Inject 3 mg into the skin every 7 days. 4/10/23  Yes Ashley Leyva MD   apixaBAN (ELIQUIS) 2.5 MG Tab Take 1 tablet by mouth every 12 hours. 2/1/23  Yes Rocky Richmond MD   gabapentin (NEURONTIN) 100 MG capsule 2 tab at bedtime prn neuropathy pain  Patient taking differently: Take 200 mg by mouth at bedtime. Neuropathy pain 1/26/23  Yes Ashley Leyva MD   carvedilol (COREG) 3.125 MG tablet Take 1 tablet by mouth in the morning and 1 tablet in the evening. Take with meals. 1/3/23  Yes Rocky Richmond MD   polyethylene glycol (MIRALAX) 17 g packet Take 17 g by mouth daily as needed (constipation). Stir and dissolve powder in any 4 to 8 ounces of beverage, then drink. 12/28/22  Yes Saravanan Ivory APNP   Omega-3 Fatty Acids (Fish Oil) 1000 MG capsule Take 2 g by mouth daily. 12/28/22  Yes Saravanan Ivory APNP   Multiple Vitamins-Minerals (Ocuvite Eye Heatlh Gummies) Chew Tab Chew 2 tablets by mouth daily. 12/28/22  Yes Saravanan Ivory APNP   ferrous sulfate 325 (65 FE) MG tablet Take 1 tablet by mouth in the morning and 1 tablet in the evening. 12/28/22  Yes Saravanan Ivory APNP   Cholecalciferol (Vitamin D3) 50 mcg (2,000 units) chew tab Chew 2 tablets by mouth every morning. Gummie chewable 12/28/22  Yes Saravanan Ivory APNP   acetaminophen (TYLENOL) 500 MG tablet Take 2 tablets by mouth every 6 hours as needed for Pain. Usually takes at night 12/28/22  Yes Saravanan Ivory APNP   pantoprazole (PROTONIX) 40 MG tablet Take 1 tablet by  mouth daily.  Patient taking differently: Take 40 mg by mouth every evening. 1/27/22  Yes Ashley Leyva MD   digoxin (LANOXIN) 0.125 MG tablet Take 1 tablet by mouth daily. 9/14/22 11/22/22  Rocky Richmond MD   losartan (COZAAR) 25 MG tablet Take 1 tablet by mouth daily. 1/27/22 12/12/22  Ashley Leyva MD     ALLERGIES:   Allergen Reactions   • Ace Inhibitors Other (See Comments)     cough   • Ezetimibe-Simvastatin NAUSEA   • Lisinopril HEADACHES, DIARRHEA and PRURITUS   • Statins GI UPSET     Social History     Tobacco Use   • Smoking status: Never   • Smokeless tobacco: Never   Vaping Use   • Vaping status: never used   Substance Use Topics   • Alcohol use: Not Currently     Alcohol/week: 1.0 standard drink of alcohol     Types: 1 Glasses of wine per week     Comment: Rare     Family History   Problem Relation Age of Onset   • Neurological Disorder Mother         Alzheimer's   • Cancer Father         Lung cancer   • Cancer Sister         Mesothelioma   • Cancer Sister         Melanoma   • Heart Brother         AMI - half brother   • Cancer Daughter         Colon       Review of Systems  Pertinent items are noted in HPI.  All other systems were reviewed and are negative.  Objective:    Vitals:    05/07/23 0729   BP: (!) 141/65   Pulse: 78   Resp: 18   Temp: 97.5 °F (36.4 °C)     Physical Exam:    General: Alert, oriented, NAD, comfortable on room air  Eyes: DON, No icterus,   Neck: Normal JVD, no bruits, No palpable thyromegaly  Ears: External exam of ears normal  Lungs: Normal chest expansion, no rales, no wheezing  CVS: Regular rhythm, III/VI murmur, No rub, No gallop, pacemaker on left side,  Abdomen: Soft, not tender, not distended, no guarding or rigidity, bowel sounds present  Extremities:3+ edema, No cyanosis  Skin: No unusual rash, Turgor normal   Neuro: Alert, cooperative, follows commands        Laboratory Results:  Recent Labs   Lab 05/07/23  0617 05/06/23  0528 05/05/23  0336 05/04/23  4708  05/04/23  1817   SODIUM 139 138 136  --  137   POTASSIUM 3.1* 4.3 3.9  --  4.3   CHLORIDE 98 100 101  --  98   CO2 30 30 28  --  30   BUN 45* 43* 45*  --  44*   CREATININE 1.33* 1.40* 1.47*   < > 1.51*   WBC  --   --  6.9  --  7.7   HCT  --   --  34.7*  --  39.6   HGB  --   --  11.1*  --  12.5   PLT  --   --  141  --  167   GLUCOSE 177* 166* 214*  --  161*   MG  --  2.3  --   --   --    CALCIUM 9.7 9.3 9.1  --  9.4   ALBUMIN  --   --   --   --  3.7    < > = values in this interval not displayed.       Imaging and other studies:      Assessment/Plan    · CKD3b:Baseline creatinine 1.3-1.6 mg/dL.  Creatinine now at baseline.  · HEART FAILURE, SEVERE PULMONARY HTN:EF 48%, RVSP 86.  Resistant to intermittent diuretic dosing. Torsemide did not do much for the patient. She may have gained 15-20 lbs from her dry weight..  Now on Lasix drip at 10 milligrams/hour + empagliflozin 10 mg daily and metolazone 5 mg daily.  Urine output has increased...  We will monitor renal function closely with diuresis.  Expect higher creatinines with diuresis  -urine output has improved.  -continue empagliflozin 10 mg daily and also  metolazone 5 mg daily to increase urine output  · TAVR 2021  · NICM, PACEMAKER  · TYPE 2 DIABETES MELLITUS: ? Adding SGLT2 inhibitors to the regimen. UA negative for blood or protein      -agree with Cardiology and starting the patient on Lasix drip at 10 milligrams/hour.   -add metolazone 5 mg daily  -add empagliflozin 10 mg p.o. daily  -monitor potassium, magnesium and renal function   -Salt and fluid restriction      Nicol Bajwa MD       show

## 2023-07-09 ENCOUNTER — EMERGENCY (EMERGENCY)
Facility: HOSPITAL | Age: 28
LOS: 0 days | Discharge: HOME | End: 2023-07-10
Attending: HOSPITALIST
Payer: MEDICAID

## 2023-07-09 VITALS — HEIGHT: 64 IN | WEIGHT: 149.91 LBS

## 2023-07-09 DIAGNOSIS — K52.9 NONINFECTIVE GASTROENTERITIS AND COLITIS, UNSPECIFIED: ICD-10-CM

## 2023-07-09 DIAGNOSIS — R19.7 DIARRHEA, UNSPECIFIED: ICD-10-CM

## 2023-07-09 DIAGNOSIS — R11.10 VOMITING, UNSPECIFIED: ICD-10-CM

## 2023-07-09 DIAGNOSIS — R10.13 EPIGASTRIC PAIN: ICD-10-CM

## 2023-07-09 PROCEDURE — 96376 TX/PRO/DX INJ SAME DRUG ADON: CPT

## 2023-07-09 PROCEDURE — 81001 URINALYSIS AUTO W/SCOPE: CPT

## 2023-07-09 PROCEDURE — 99284 EMERGENCY DEPT VISIT MOD MDM: CPT

## 2023-07-09 PROCEDURE — 96374 THER/PROPH/DIAG INJ IV PUSH: CPT

## 2023-07-09 PROCEDURE — 99284 EMERGENCY DEPT VISIT MOD MDM: CPT | Mod: 25

## 2023-07-09 PROCEDURE — 96375 TX/PRO/DX INJ NEW DRUG ADDON: CPT

## 2023-07-09 PROCEDURE — 85025 COMPLETE CBC W/AUTO DIFF WBC: CPT

## 2023-07-09 PROCEDURE — 36415 COLL VENOUS BLD VENIPUNCTURE: CPT

## 2023-07-09 PROCEDURE — 87086 URINE CULTURE/COLONY COUNT: CPT

## 2023-07-10 VITALS
HEART RATE: 63 BPM | OXYGEN SATURATION: 99 % | SYSTOLIC BLOOD PRESSURE: 92 MMHG | RESPIRATION RATE: 17 BRPM | DIASTOLIC BLOOD PRESSURE: 53 MMHG | TEMPERATURE: 98 F

## 2023-07-10 LAB
APPEARANCE UR: CLEAR — SIGNIFICANT CHANGE UP
BACTERIA # UR AUTO: ABNORMAL
BASOPHILS # BLD AUTO: 0.06 K/UL — SIGNIFICANT CHANGE UP (ref 0–0.2)
BASOPHILS NFR BLD AUTO: 0.7 % — SIGNIFICANT CHANGE UP (ref 0–2)
BILIRUB UR-MCNC: NEGATIVE — SIGNIFICANT CHANGE UP
COD CRY URNS QL: ABNORMAL
COLOR SPEC: YELLOW — SIGNIFICANT CHANGE UP
DIFF PNL FLD: ABNORMAL
EOSINOPHIL # BLD AUTO: 0.15 K/UL — SIGNIFICANT CHANGE UP (ref 0–0.5)
EOSINOPHIL NFR BLD AUTO: 1.7 % — SIGNIFICANT CHANGE UP (ref 0–6)
EPI CELLS # UR: SIGNIFICANT CHANGE UP
GLUCOSE UR QL: NEGATIVE — SIGNIFICANT CHANGE UP
HCT VFR BLD CALC: 39 % — SIGNIFICANT CHANGE UP (ref 34.5–45)
HGB BLD-MCNC: 13 G/DL — SIGNIFICANT CHANGE UP (ref 11.5–15.5)
HYALINE CASTS # UR AUTO: NEGATIVE /LPF — SIGNIFICANT CHANGE UP
IMM GRANULOCYTES NFR BLD AUTO: 0.1 % — SIGNIFICANT CHANGE UP (ref 0–0.9)
KETONES UR-MCNC: ABNORMAL
LEUKOCYTE ESTERASE UR-ACNC: ABNORMAL
LYMPHOCYTES # BLD AUTO: 3.81 K/UL — HIGH (ref 1–3.3)
LYMPHOCYTES # BLD AUTO: 43.1 % — SIGNIFICANT CHANGE UP (ref 13–44)
MCHC RBC-ENTMCNC: 29 PG — SIGNIFICANT CHANGE UP (ref 27–34)
MCHC RBC-ENTMCNC: 33.3 GM/DL — SIGNIFICANT CHANGE UP (ref 32–36)
MCV RBC AUTO: 86.9 FL — SIGNIFICANT CHANGE UP (ref 80–100)
MONOCYTES # BLD AUTO: 0.59 K/UL — SIGNIFICANT CHANGE UP (ref 0–0.9)
MONOCYTES NFR BLD AUTO: 6.7 % — SIGNIFICANT CHANGE UP (ref 2–14)
NEUTROPHILS # BLD AUTO: 4.22 K/UL — SIGNIFICANT CHANGE UP (ref 1.8–7.4)
NEUTROPHILS NFR BLD AUTO: 47.7 % — SIGNIFICANT CHANGE UP (ref 43–77)
NITRITE UR-MCNC: NEGATIVE — SIGNIFICANT CHANGE UP
PH UR: 5 — SIGNIFICANT CHANGE UP (ref 5–8)
PLATELET # BLD AUTO: 269 K/UL — SIGNIFICANT CHANGE UP (ref 150–400)
PROT UR-MCNC: NEGATIVE — SIGNIFICANT CHANGE UP
RBC # BLD: 4.49 M/UL — SIGNIFICANT CHANGE UP (ref 3.8–5.2)
RBC # FLD: 13.2 % — SIGNIFICANT CHANGE UP (ref 10.3–14.5)
RBC CASTS # UR COMP ASSIST: SIGNIFICANT CHANGE UP /HPF (ref 0–4)
SP GR SPEC: 1.03 — HIGH (ref 1.01–1.02)
UROBILINOGEN FLD QL: NEGATIVE — SIGNIFICANT CHANGE UP
WBC # BLD: 8.84 K/UL — SIGNIFICANT CHANGE UP (ref 3.8–10.5)
WBC # FLD AUTO: 8.84 K/UL — SIGNIFICANT CHANGE UP (ref 3.8–10.5)
WBC UR QL: SIGNIFICANT CHANGE UP /HPF (ref 0–5)

## 2023-07-10 RX ORDER — LIDOCAINE 4 G/100G
10 CREAM TOPICAL ONCE
Refills: 0 | Status: DISCONTINUED | OUTPATIENT
Start: 2023-07-10 | End: 2023-07-10

## 2023-07-10 RX ORDER — MORPHINE SULFATE 50 MG/1
4 CAPSULE, EXTENDED RELEASE ORAL ONCE
Refills: 0 | Status: DISCONTINUED | OUTPATIENT
Start: 2023-07-10 | End: 2023-07-10

## 2023-07-10 RX ORDER — FAMOTIDINE 10 MG/ML
20 INJECTION INTRAVENOUS ONCE
Refills: 0 | Status: COMPLETED | OUTPATIENT
Start: 2023-07-10 | End: 2023-07-10

## 2023-07-10 RX ORDER — ONDANSETRON 8 MG/1
1 TABLET, FILM COATED ORAL
Qty: 9 | Refills: 0
Start: 2023-07-10 | End: 2023-07-12

## 2023-07-10 RX ORDER — SODIUM CHLORIDE 9 MG/ML
1000 INJECTION INTRAMUSCULAR; INTRAVENOUS; SUBCUTANEOUS ONCE
Refills: 0 | Status: COMPLETED | OUTPATIENT
Start: 2023-07-10 | End: 2023-07-10

## 2023-07-10 RX ORDER — ONDANSETRON 8 MG/1
4 TABLET, FILM COATED ORAL ONCE
Refills: 0 | Status: COMPLETED | OUTPATIENT
Start: 2023-07-10 | End: 2023-07-10

## 2023-07-10 RX ORDER — KETOROLAC TROMETHAMINE 30 MG/ML
30 SYRINGE (ML) INJECTION ONCE
Refills: 0 | Status: DISCONTINUED | OUTPATIENT
Start: 2023-07-10 | End: 2023-07-10

## 2023-07-10 RX ADMIN — Medication 30 MILLILITER(S): at 04:46

## 2023-07-10 RX ADMIN — SODIUM CHLORIDE 1000 MILLILITER(S): 9 INJECTION INTRAMUSCULAR; INTRAVENOUS; SUBCUTANEOUS at 03:02

## 2023-07-10 RX ADMIN — MORPHINE SULFATE 4 MILLIGRAM(S): 50 CAPSULE, EXTENDED RELEASE ORAL at 03:02

## 2023-07-10 RX ADMIN — FAMOTIDINE 20 MILLIGRAM(S): 10 INJECTION INTRAVENOUS at 04:47

## 2023-07-10 RX ADMIN — ONDANSETRON 4 MILLIGRAM(S): 8 TABLET, FILM COATED ORAL at 01:37

## 2023-07-10 RX ADMIN — SODIUM CHLORIDE 1000 MILLILITER(S): 9 INJECTION INTRAMUSCULAR; INTRAVENOUS; SUBCUTANEOUS at 01:38

## 2023-07-10 RX ADMIN — ONDANSETRON 4 MILLIGRAM(S): 8 TABLET, FILM COATED ORAL at 04:49

## 2023-07-10 RX ADMIN — Medication 30 MILLIGRAM(S): at 01:37

## 2023-07-10 NOTE — ED ADULT NURSE NOTE - OBJECTIVE STATEMENT
28y female presents to the ED A/OX4, c/o of vomiting and abdominal pain. 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 Pt endorses abdominal pain, and nausea.

## 2023-07-10 NOTE — ED PROVIDER NOTE - OBJECTIVE STATEMENT
28-year-old female with now past medical history presents with vomiting and diarrhea times few episodes this evening.  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  No fever.  Reports epigastric discomfort and pain since the vomiting started.

## 2023-07-10 NOTE — ED ADULT NURSE NOTE - NS ED NURSE DISCH DISPOSITION
GI consult  MRCP to evaluate for choledocholithiasis or CBD obstruction  If obstruction noted will likely need ERCP, if no obstruction or plan for cholecystectomy tomorrow   Discharged

## 2023-07-10 NOTE — ED PROVIDER NOTE - NSFOLLOWUPINSTRUCTIONS_ED_ALL_ED_FT
please take Zofran as prescribed as needed for nausea.    Viral Gastroenteritis, Child  Viral gastroenteritis is also known as the stomach flu. This condition is caused by various viruses. These viruses can be passed from person to person very easily (are very contagious). This condition may affect the stomach, small intestine, and large intestine. It can cause sudden watery diarrhea, fever, and vomiting.    Diarrhea and vomiting can make your child feel weak and cause him or her to become dehydrated. Your child may not be able to keep fluids down. Dehydration can make your child tired and thirsty. Your child may also urinate less often and have a dry mouth. Dehydration can happen very quickly and can be dangerous.    It is important to replace the fluids that your child loses from diarrhea and vomiting. If your child becomes severely dehydrated, he or she may need to get fluids through an IV tube.    What are the causes?  Gastroenteritis is caused by various viruses, including rotavirus and norovirus. Your child can get sick by eating food, drinking water, or touching a surface contaminated with one of these viruses. Your child may also get sick from sharing utensils or other personal items with an infected person.    What increases the risk?  This condition is more likely to develop in children who:    Are not vaccinated against rotavirus.  Live with one or more children who are younger than 2 years old.  Go to a  facility.  Have a weak defense system (immune system).    What are the signs or symptoms?  Symptoms of this condition start suddenly 1–2 days after exposure to a virus. Symptoms may last a few days or as long as a week. The most common symptoms are watery diarrhea and vomiting. Other symptoms include:    Fever.  Headache.  Fatigue.  Pain in the abdomen.  Chills.  Weakness.  Nausea.  Muscle aches.  Loss of appetite.    How is this diagnosed?  This condition is diagnosed with a medical history and physical exam. Your child may also have a stool test to check for viruses.    How is this treated?  This condition typically goes away on its own. The focus of treatment is to prevent dehydration and restore lost fluids (rehydration). Your child's health care provider may recommend that your child takes an oral rehydration solution (ORS) to replace important salts and minerals (electrolytes). Severe cases of this condition may require fluids given through an IV tube.    Treatment may also include medicine to help with your child's symptoms.    Follow these instructions at home:  Follow instructions from your child's health care provider about how to care for your child at home.    Eating and drinking     Follow these recommendations as told by your child's health care provider:    Give your child an ORS, if directed. This is a drink that is sold at pharmacies and retail stores.  Encourage your child to drink clear fluids, such as water, low-calorie popsicles, and diluted fruit juice.  Continue to breastfeed or bottle-feed your young child. Do this in small amounts and frequently. Do not give extra water to your infant.  Encourage your child to eat soft foods in small amounts every 3–4 hours, if your child is eating solid food. Continue your child's regular diet, but avoid spicy or fatty foods, such as french fries and pizza.  Avoid giving your child fluids that contain a lot of sugar or caffeine, such as juice and soda.    General instructions     Have your child rest at home until his or her symptoms have gone away.  Make sure that you and your child wash your hands often. If soap and water are not available, use hand .  Make sure that all people in your household wash their hands well and often.  Give over-the-counter and prescription medicines only as told by your child's health care provider.  Watch your child's condition for any changes.  Give your child a warm bath to relieve any burning or pain from frequent diarrhea episodes.  Keep all follow-up visits as told by your child's health care provider. This is important.  Contact a health care provider if:  Your child has a fever.  Your child will not drink fluids.  Your child cannot keep fluids down.  Your child's symptoms are getting worse.  Your child has new symptoms.  Your child feels light-headed or dizzy.  Get help right away if:  You notice signs of dehydration in your child, such as:    No urine in 8–12 hours.  Cracked lips.  Not making tears while crying.  Dry mouth.  Sunken eyes.  Sleepiness.  Weakness.  Dry skin that does not flatten after being gently pinched.    You see blood in your child's vomit.  Your child's vomit looks like coffee grounds.  Your child has bloody or black stools or stools that look like tar.  Your child has a severe headache, a stiff neck, or both.  Your child has trouble breathing or is breathing very quickly.  Your child's heart is beating very quickly.  Your child's skin feels cold and clammy.  Your child seems confused.  Your child has pain when he or she urinates.  This information is not intended to replace advice given to you by your health care provider. Make sure you discuss any questions you have with your health care provider.

## 2023-07-10 NOTE — ED ADULT TRIAGE NOTE - SPO2 (%)
100 Cimzia Counseling:  I discussed with the patient the risks of Cimzia including but not limited to immunosuppression, allergic reactions and infections.  The patient understands that monitoring is required including a PPD at baseline and must alert us or the primary physician if symptoms of infection or other concerning signs are noted.

## 2023-07-10 NOTE — ED PROVIDER NOTE - PHYSICAL EXAMINATION
***GEN - NAD; well appearing; A+O x3 ***HEAD - NC/AT ***EYES/NOSE - PERRL, EOMI, mucous membranes moist, no discharge ***THROAT: Oral cavity and pharynx normal. No inflammation, swelling, exudate, or lesions.  ***NECK: Neck supple, non-tender  ***PULMONARY - CTA b/l, symmetric breath sounds. ***CARDIAC -s1s2, RRR, no M,G,R  ***ABDOMEN - +BS, ND, +ttp in epigastric area, soft, no guarding  ***BACK - no CVA tenderness, Normal  spine ***EXTREMITIES - symmetric pulses, 2+ dp, capillary refill < 2 seconds  ***SKIN - no rash or bruising   ***NEUROLOGIC - alert, CN 2-12 intact

## 2023-07-10 NOTE — ED ADULT NURSE NOTE - NSFALLUNIVINTERV_ED_ALL_ED
Bed/Stretcher in lowest position, wheels locked, appropriate side rails in place/Call bell, personal items and telephone in reach/Instruct patient to call for assistance before getting out of bed/chair/stretcher/Non-slip footwear applied when patient is off stretcher/[place] to call system/Physically safe environment - no spills, clutter or unnecessary equipment/Purposeful proactive rounding/Room/bathroom lighting operational, light cord in reach

## 2023-07-10 NOTE — ED PROVIDER NOTE - CLINICAL SUMMARY MEDICAL DECISION MAKING FREE TEXT BOX
28-year-old female with vomiting and diarrhea for the past few hours.  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  Will obtain labs hydrate with IV fluids and give Zofran as well as pain control for epigastric pain.  Patient tolerating p.o. intake.  Does describe some continued pain.  Will premedicate and reevaluate.  Patient feeling better will discharge home.

## 2023-07-10 NOTE — ED PROVIDER NOTE - PATIENT PORTAL LINK FT
You can access the FollowMyHealth Patient Portal offered by [facility] by registering at the following website: [email or web address]. By joining [employer]’s FollowMyHealth portal, you will also be able to view your health information using other applications (apps) compatible with our system.

## 2023-07-11 LAB
CULTURE RESULTS: SIGNIFICANT CHANGE UP
SPECIMEN SOURCE: SIGNIFICANT CHANGE UP

## 2023-07-19 ENCOUNTER — OUTPATIENT (OUTPATIENT)
Dept: OUTPATIENT SERVICES | Facility: HOSPITAL | Age: 28
LOS: 1 days | End: 2023-07-19
Payer: MEDICAID

## 2023-07-19 ENCOUNTER — APPOINTMENT (OUTPATIENT)
Dept: CT IMAGING | Facility: CLINIC | Age: 28
End: 2023-07-19
Payer: MEDICAID

## 2023-07-19 DIAGNOSIS — R10.13 EPIGASTRIC PAIN: ICD-10-CM

## 2023-07-19 PROCEDURE — 74177 CT ABD & PELVIS W/CONTRAST: CPT | Mod: 26

## 2023-07-19 PROCEDURE — 74177 CT ABD & PELVIS W/CONTRAST: CPT

## 2023-08-10 ENCOUNTER — APPOINTMENT (OUTPATIENT)
Dept: GASTROENTEROLOGY | Facility: CLINIC | Age: 28
End: 2023-08-10
Payer: COMMERCIAL

## 2023-08-10 VITALS
WEIGHT: 160 LBS | HEIGHT: 64 IN | BODY MASS INDEX: 27.31 KG/M2 | SYSTOLIC BLOOD PRESSURE: 120 MMHG | DIASTOLIC BLOOD PRESSURE: 74 MMHG

## 2023-08-10 DIAGNOSIS — K62.5 HEMORRHAGE OF ANUS AND RECTUM: ICD-10-CM

## 2023-08-10 DIAGNOSIS — R10.9 UNSPECIFIED ABDOMINAL PAIN: ICD-10-CM

## 2023-08-10 PROCEDURE — 99213 OFFICE O/P EST LOW 20 MIN: CPT

## 2023-08-12 PROBLEM — K62.5 RECTAL BLEEDING: Status: ACTIVE | Noted: 2023-08-12

## 2023-08-12 NOTE — HISTORY OF PRESENT ILLNESS
[FreeTextEntry1] : 29yo female with GERD, IBS  She was doing well for months but developed recurrent GERD She had bad episode after dinner and went to ER with negative evaluation She alos had some increased constipation and developed scant BRBPR

## 2023-08-12 NOTE — ASSESSMENT
[FreeTextEntry1] : 29yo female with gerd, constipation and bleeding  will change to pantoprazole pre-dinner stool softeners  to call if bleeding worsens

## 2023-08-12 NOTE — PHYSICAL EXAM
[Alert] : alert [Normal Voice/Communication] : normal voice/communication [Healthy Appearing] : healthy appearing [No Acute Distress] : no acute distress [Sclera] : the sclera and conjunctiva were normal [Hearing Threshold Finger Rub Not [place]] : hearing was normal [Normal Lips/Gums] : the lips and gums were normal [Oropharynx] : the oropharynx was normal [Normal Appearance] : the appearance of the neck was normal [No Neck Mass] : no neck mass was observed [No Respiratory Distress] : no respiratory distress [No Acc Muscle Use] : no accessory muscle use [Respiration, Rhythm And Depth] : normal respiratory rhythm and effort [Auscultation Breath Sounds / Voice Sounds] : lungs were clear to auscultation bilaterally [Normal S1, S2] : normal S1 and S2 [Heart Rate And Rhythm] : heart rate was normal and rhythm regular [Murmurs] : no murmurs [Bowel Sounds] : normal bowel sounds [Abdomen Tenderness] : non-tender [No Masses] : no abdominal mass palpated [Abdomen Soft] : soft [] : no hepatosplenomegaly [Oriented To Time, Place, And Person] : oriented to person, place, and time

## 2023-09-06 ENCOUNTER — RX CHANGE (OUTPATIENT)
Age: 28
End: 2023-09-06

## 2023-09-06 RX ORDER — PANTOPRAZOLE 40 MG/1
40 TABLET, DELAYED RELEASE ORAL DAILY
Qty: 30 | Refills: 5 | Status: DISCONTINUED | COMMUNITY
Start: 2023-08-10 | End: 2023-09-06

## 2023-09-18 ENCOUNTER — EMERGENCY (EMERGENCY)
Facility: HOSPITAL | Age: 28
LOS: 1 days | Discharge: HOME | End: 2023-09-18
Attending: EMERGENCY MEDICINE | Admitting: EMERGENCY MEDICINE
Payer: COMMERCIAL

## 2023-09-18 VITALS
WEIGHT: 160.72 LBS | RESPIRATION RATE: 17 BRPM | DIASTOLIC BLOOD PRESSURE: 76 MMHG | SYSTOLIC BLOOD PRESSURE: 107 MMHG | OXYGEN SATURATION: 96 % | TEMPERATURE: 98 F | HEIGHT: 64 IN | HEART RATE: 78 BPM

## 2023-09-18 LAB
ANION GAP SERPL CALC-SCNC: 10 MMOL/L — SIGNIFICANT CHANGE UP (ref 5–17)
APPEARANCE UR: ABNORMAL
BACTERIA # UR AUTO: ABNORMAL /HPF
BASOPHILS # BLD AUTO: 0.06 K/UL — SIGNIFICANT CHANGE UP (ref 0–0.2)
BASOPHILS NFR BLD AUTO: 0.8 % — SIGNIFICANT CHANGE UP (ref 0–2)
BILIRUB UR-MCNC: NEGATIVE — SIGNIFICANT CHANGE UP
BUN SERPL-MCNC: 7 MG/DL — SIGNIFICANT CHANGE UP (ref 7–23)
CALCIUM SERPL-MCNC: 9.7 MG/DL — SIGNIFICANT CHANGE UP (ref 8.4–10.5)
CHLORIDE SERPL-SCNC: 102 MMOL/L — SIGNIFICANT CHANGE UP (ref 96–108)
CO2 SERPL-SCNC: 25 MMOL/L — SIGNIFICANT CHANGE UP (ref 22–31)
COD CRY URNS QL: PRESENT
COLOR SPEC: YELLOW — SIGNIFICANT CHANGE UP
CREAT SERPL-MCNC: 0.71 MG/DL — SIGNIFICANT CHANGE UP (ref 0.5–1.3)
DIFF PNL FLD: ABNORMAL
EGFR: 118 ML/MIN/1.73M2 — SIGNIFICANT CHANGE UP
EOSINOPHIL # BLD AUTO: 0.07 K/UL — SIGNIFICANT CHANGE UP (ref 0–0.5)
EOSINOPHIL NFR BLD AUTO: 1 % — SIGNIFICANT CHANGE UP (ref 0–6)
EPI CELLS # UR: PRESENT
GLUCOSE SERPL-MCNC: 105 MG/DL — HIGH (ref 70–99)
GLUCOSE UR QL: NEGATIVE MG/DL — SIGNIFICANT CHANGE UP
HCG UR QL: NEGATIVE — SIGNIFICANT CHANGE UP
HCT VFR BLD CALC: 38.4 % — SIGNIFICANT CHANGE UP (ref 34.5–45)
HGB BLD-MCNC: 12.6 G/DL — SIGNIFICANT CHANGE UP (ref 11.5–15.5)
IMM GRANULOCYTES NFR BLD AUTO: 0.6 % — SIGNIFICANT CHANGE UP (ref 0–0.9)
KETONES UR-MCNC: ABNORMAL MG/DL
LEUKOCYTE ESTERASE UR-ACNC: ABNORMAL
LYMPHOCYTES # BLD AUTO: 2.72 K/UL — SIGNIFICANT CHANGE UP (ref 1–3.3)
LYMPHOCYTES # BLD AUTO: 37.9 % — SIGNIFICANT CHANGE UP (ref 13–44)
MCHC RBC-ENTMCNC: 28.1 PG — SIGNIFICANT CHANGE UP (ref 27–34)
MCHC RBC-ENTMCNC: 32.8 GM/DL — SIGNIFICANT CHANGE UP (ref 32–36)
MCV RBC AUTO: 85.7 FL — SIGNIFICANT CHANGE UP (ref 80–100)
MONOCYTES # BLD AUTO: 0.48 K/UL — SIGNIFICANT CHANGE UP (ref 0–0.9)
MONOCYTES NFR BLD AUTO: 6.7 % — SIGNIFICANT CHANGE UP (ref 2–14)
NEUTROPHILS # BLD AUTO: 3.81 K/UL — SIGNIFICANT CHANGE UP (ref 1.8–7.4)
NEUTROPHILS NFR BLD AUTO: 53 % — SIGNIFICANT CHANGE UP (ref 43–77)
NITRITE UR-MCNC: NEGATIVE — SIGNIFICANT CHANGE UP
NRBC # BLD: 0 /100 WBCS — SIGNIFICANT CHANGE UP (ref 0–0)
PH UR: 5.5 — SIGNIFICANT CHANGE UP (ref 5–8)
PLATELET # BLD AUTO: 331 K/UL — SIGNIFICANT CHANGE UP (ref 150–400)
POTASSIUM SERPL-MCNC: 3.8 MMOL/L — SIGNIFICANT CHANGE UP (ref 3.5–5.3)
POTASSIUM SERPL-SCNC: 3.8 MMOL/L — SIGNIFICANT CHANGE UP (ref 3.5–5.3)
PROT UR-MCNC: SIGNIFICANT CHANGE UP MG/DL
RBC # BLD: 4.48 M/UL — SIGNIFICANT CHANGE UP (ref 3.8–5.2)
RBC # FLD: 12.6 % — SIGNIFICANT CHANGE UP (ref 10.3–14.5)
RBC CASTS # UR COMP ASSIST: 2 /HPF — SIGNIFICANT CHANGE UP (ref 0–4)
SODIUM SERPL-SCNC: 137 MMOL/L — SIGNIFICANT CHANGE UP (ref 135–145)
SP GR SPEC: 1.02 — SIGNIFICANT CHANGE UP (ref 1–1.03)
UROBILINOGEN FLD QL: 1 MG/DL — SIGNIFICANT CHANGE UP (ref 0.2–1)
WBC # BLD: 7.18 K/UL — SIGNIFICANT CHANGE UP (ref 3.8–10.5)
WBC # FLD AUTO: 7.18 K/UL — SIGNIFICANT CHANGE UP (ref 3.8–10.5)
WBC UR QL: 15 /HPF — HIGH (ref 0–5)

## 2023-09-18 PROCEDURE — 71045 X-RAY EXAM CHEST 1 VIEW: CPT | Mod: 26

## 2023-09-18 PROCEDURE — 93010 ELECTROCARDIOGRAM REPORT: CPT

## 2023-09-18 PROCEDURE — 99285 EMERGENCY DEPT VISIT HI MDM: CPT | Mod: 25

## 2023-09-18 RX ORDER — SODIUM CHLORIDE 9 MG/ML
1000 INJECTION INTRAMUSCULAR; INTRAVENOUS; SUBCUTANEOUS ONCE
Refills: 0 | Status: COMPLETED | OUTPATIENT
Start: 2023-09-18 | End: 2023-09-18

## 2023-09-18 RX ADMIN — SODIUM CHLORIDE 1000 MILLILITER(S): 9 INJECTION INTRAMUSCULAR; INTRAVENOUS; SUBCUTANEOUS at 23:36

## 2023-09-18 NOTE — ED PROVIDER NOTE - CLINICAL SUMMARY MEDICAL DECISION MAKING FREE TEXT BOX
Patient with palpitations on and off since Saturday night.  Normal heart rate now.  EKG unremarkable.  Will check labs and give IV fluids.  If no emergent findings patient can follow-up with cardiology for possible ambulatory monitor.

## 2023-09-18 NOTE — ED PROVIDER NOTE - CARE PROVIDER_API CALL
Aniceto Salguero  Cardiology  [address], [address]  [place] [address]  Phone: [phone]  Fax: [phone]  Follow Up Time:     Lis Kumar  [facility]  [address], [address]  [place] [address]  Phone: [phone]  Fax: [phone]  Follow Up Time:

## 2023-09-18 NOTE — ED ADULT TRIAGE NOTE - CHIEF COMPLAINT QUOTE
Over the weekend I had chest pain and I had a headache also over the weekend; Today my head hurts and my chest feels like its racing. I started coughing today too. Denies fever

## 2023-09-18 NOTE — ED ADULT NURSE NOTE - NSFALLUNIVINTERV_ED_ALL_ED
Bed/Stretcher in lowest position, wheels locked, appropriate side rails in place/Call bell, personal items and telephone in reach/Instruct patient to call for assistance before getting out of bed/chair/stretcher/Non-slip footwear applied when patient is off stretcher/[employer] to call system/Physically safe environment - no spills, clutter or unnecessary equipment/Purposeful proactive rounding/Room/bathroom lighting operational, light cord in reach

## 2023-09-18 NOTE — ED ADULT NURSE NOTE - HISTORY OF COVID-19 VACCINATION
Date: 8/14/2022  Patient: Pippa Gil  Admitted: 8/13/2022 11:42 PM  Attending Provider: Faheem Ferguson DO    Transfer to [facility] was arranged due to Specific clinical requirements. Attending physician at the receiving facil ity was in agreement and accepted the patient as indicated on EMTALA documentation. Patient condition at time of transfer was Patient stabilized.
Yes

## 2023-09-18 NOTE — ED PROVIDER NOTE - PATIENT PORTAL LINK FT
You can access the FollowMyHealth Patient Portal offered by [facility] by registering at the following website: [email or web address]. By joining "[employer]"’s FollowMyHealth portal, you will also be able to view your health information using other applications (apps) compatible with our system.

## 2023-09-18 NOTE — ED PROVIDER NOTE - OBJECTIVE STATEMENT
I forgot to patient presents with complaints of palpitations.  Patient had palpitations and a headache over the weekend.  Patient states she had the symptoms from Saturday night to Sunday.  The headache has resolved.  Patient states the palpitations had resolved but came back today.  States she feels her heart racing, But has no chest pain.  No fever.  Mild cough today.  No nausea or vomiting.  No dizziness.  Patient denies history of heart disease or palpitations in the past.
urine = no gross hematuria  not anemic  inr = wnl

## 2023-09-18 NOTE — ED ADULT NURSE NOTE - NSICDXPASTSURGICALHX_GEN_ALL_CORE_FT
Patient Name: Saskia Ang  : 1975  MRN: [id number]  DATE: 23      ENDOSCOPY  History and Physical    Procedure:    [] Diagnostic Colonoscopy       [x] Screening Colonoscopy  [] EGD      [] ERCP      [] EUS       [] Other    [x] Previous office notes/History and Physical reviewed from the patients chart. Please see EMR for further details of HPI. I have examined the patient's status immediately prior to the procedure and:      Indications/HPI:    []Abdominal Pain   []Cancer- GI/Lung  []Fhx of colon CA/polyps  []History of Polyps   []Ortizs   []Melena  []Abnormal Imaging   []Dysphagia    []Persistent Pneumonia  []Anemia   []Food Impaction  []History of Polyps  []GI Bleed   []Pulmonary nodule/Mass  []Change in bowel habits  []Heartburn/Reflux  []Rectal Bleed (BRBPR)  []Chest Pain - Non Cardiac  []Heme (+) Stool  []Ulcers  []Constipation   []Hemoptysis   []Varices  []Diarrhea   []Hypoxemia  []Nausea/Vomiting   [x]Screening   []Crohns/Colitis  []Other:    Anesthesia:   [x] MAC [] Moderate Sedation   [] General   [] None     ROS: 12 pt Review of Symptoms was negative unless mentioned above    Medications:   Prior to Admission medications    Medication Sig Start Date End Date Taking? Authorizing Provider   carvedilol (COREG) 6.25 MG tablet Take 1 tablet by mouth 2 times daily 4/3/23   Sera Townsend MD   omeprazole (PRILOSEC) 10 MG delayed release capsule Take 1 capsule by mouth daily 20   Aurelio Hargrove MD   Ascorbic Acid (VITAMIN C) 500 MG tablet Take 500 mg by mouth daily as needed     Historical Provider, MD   Vitamin D (CHOLECALCIFEROL) 1000 UNITS CAPS capsule Take 1,000 Units by mouth daily. Historical Provider, MD   loratadine (CLARITIN) 10 MG tablet Take 10 mg by mouth daily. Historical Provider, MD   Multiple Vitamins-Minerals (MULTI COMPLETE PO) Take 1 tablet by mouth daily. Historical Provider, MD       Allergies:    Allergies   Allergen Reactions    Cat Hair Extract PAST SURGICAL HISTORY:  No significant past surgical history

## 2023-09-18 NOTE — ED ADULT NURSE NOTE - CHPI ED NUR SYMPTOMS POS
Over the weekend I had chest pain and I had a headache also over the weekend; Today my head hurts and my chest feels like its racing. I started coughing today too. Denies fever/CHEST PAIN

## 2023-09-18 NOTE — ED PROVIDER NOTE - CARE PLAN
1 Principal Discharge DX:	Palpitations   Principal Discharge DX:	Palpitations  Secondary Diagnosis:	Acute UTI

## 2023-09-19 VITALS
TEMPERATURE: 98 F | DIASTOLIC BLOOD PRESSURE: 77 MMHG | OXYGEN SATURATION: 99 % | SYSTOLIC BLOOD PRESSURE: 112 MMHG | RESPIRATION RATE: 18 BRPM | HEART RATE: 77 BPM

## 2023-09-19 LAB
ALBUMIN SERPL ELPH-MCNC: 4.2 G/DL — SIGNIFICANT CHANGE UP (ref 3.3–5)
ALP SERPL-CCNC: 68 U/L — SIGNIFICANT CHANGE UP (ref 30–120)
ALT FLD-CCNC: 20 U/L — SIGNIFICANT CHANGE UP (ref 10–60)
AST SERPL-CCNC: 21 U/L — SIGNIFICANT CHANGE UP (ref 10–40)
BILIRUB SERPL-MCNC: 0.5 MG/DL — SIGNIFICANT CHANGE UP (ref 0.2–1.2)
PROT SERPL-MCNC: 7.4 G/DL — SIGNIFICANT CHANGE UP (ref 6–8.3)
TROPONIN I, HIGH SENSITIVITY RESULT: 4.3 NG/L — SIGNIFICANT CHANGE UP

## 2023-09-19 PROCEDURE — 36415 COLL VENOUS BLD VENIPUNCTURE: CPT

## 2023-09-19 PROCEDURE — 81001 URINALYSIS AUTO W/SCOPE: CPT

## 2023-09-19 PROCEDURE — 84484 ASSAY OF TROPONIN QUANT: CPT

## 2023-09-19 PROCEDURE — 99285 EMERGENCY DEPT VISIT HI MDM: CPT | Mod: 25

## 2023-09-19 PROCEDURE — 93005 ELECTROCARDIOGRAM TRACING: CPT

## 2023-09-19 PROCEDURE — 85025 COMPLETE CBC W/AUTO DIFF WBC: CPT

## 2023-09-19 PROCEDURE — 81025 URINE PREGNANCY TEST: CPT

## 2023-09-19 PROCEDURE — 87086 URINE CULTURE/COLONY COUNT: CPT

## 2023-09-19 PROCEDURE — 96360 HYDRATION IV INFUSION INIT: CPT

## 2023-09-19 PROCEDURE — 80053 COMPREHEN METABOLIC PANEL: CPT

## 2023-09-19 PROCEDURE — 71045 X-RAY EXAM CHEST 1 VIEW: CPT

## 2023-09-19 RX ORDER — CEFUROXIME AXETIL 250 MG
1 TABLET ORAL
Qty: 14 | Refills: 0
Start: 2023-09-19 | End: 2023-09-25

## 2023-09-19 RX ORDER — CEFUROXIME AXETIL 250 MG
500 TABLET ORAL ONCE
Refills: 0 | Status: COMPLETED | OUTPATIENT
Start: 2023-09-19 | End: 2023-09-19

## 2023-09-19 RX ADMIN — SODIUM CHLORIDE 1000 MILLILITER(S): 9 INJECTION INTRAMUSCULAR; INTRAVENOUS; SUBCUTANEOUS at 00:30

## 2023-09-19 RX ADMIN — Medication 500 MILLIGRAM(S): at 00:27

## 2023-09-21 LAB
CULTURE RESULTS: SIGNIFICANT CHANGE UP
SPECIMEN SOURCE: SIGNIFICANT CHANGE UP

## 2023-10-26 ENCOUNTER — APPOINTMENT (OUTPATIENT)
Dept: GASTROENTEROLOGY | Facility: CLINIC | Age: 28
End: 2023-10-26
Payer: COMMERCIAL

## 2023-10-26 VITALS
DIASTOLIC BLOOD PRESSURE: 70 MMHG | WEIGHT: 157 LBS | BODY MASS INDEX: 26.8 KG/M2 | SYSTOLIC BLOOD PRESSURE: 110 MMHG | HEIGHT: 64 IN

## 2023-10-26 DIAGNOSIS — Z09 ENCOUNTER FOR FOLLOW-UP EXAMINATION AFTER COMPLETED TREATMENT FOR CONDITIONS OTHER THAN MALIGNANT NEOPLASM: ICD-10-CM

## 2023-10-26 PROCEDURE — 99212 OFFICE O/P EST SF 10 MIN: CPT

## 2023-11-10 NOTE — ASU PREOP CHECKLIST - PATIENT'S PERSONAL PROPERTY GIVEN TO
11/10/2023    Lilia Funez (:  1961) is a 60 y.o. male, here for a preventive medicine evaluation. Patient Active Problem List   Diagnosis    Essential hypertension    Foot pain, left    Hypertension    GERD (gastroesophageal reflux disease)    Eczema     The patient is currently taking all hypertensive medications compliantly without c/o of any side effects. Denies chest pain, dyspnea, edema, palpiations. Blood pressure has been averaging  120-130/80 over the last several months. Patient's reflux well controlled on current medications. Patient denies any blood in stool black stool, heartburn, reflux. Denies issues with frequent coughing or reflux while sleeping. Able to eat and drink appropriately without complications. Continues on daily Nexium w/o complications. Lab Results   Component Value Date    LDLCALC 106 (H) 2023     The 10-year ASCVD risk score (Nicko MATTHEWS, et al., 2019) is: 11.9%    Values used to calculate the score:      Age: 60 years      Sex: Male      Is Non- : No      Diabetic: No      Tobacco smoker: No      Systolic Blood Pressure: 797 mmHg      Is BP treated: Yes      HDL Cholesterol: 52 mg/dL      Total Cholesterol: 176 mg/dL    -PSA and colon cancer screenings UTD and WNL    Review of Systems   Constitutional:  Negative for activity change, appetite change, fatigue and fever. HENT:  Negative for congestion, sinus pressure, sinus pain and sore throat. Eyes: Negative. Respiratory:  Negative for cough, chest tightness, shortness of breath and wheezing. Cardiovascular:  Negative for chest pain and palpitations. Gastrointestinal:  Negative for abdominal distention, abdominal pain, constipation, diarrhea and nausea. Genitourinary:  Negative for difficulty urinating, dysuria, flank pain, frequency and hematuria. Musculoskeletal:  Negative for arthralgias, gait problem, joint swelling and myalgias.    Skin:  Negative for color on unit/1b

## 2024-01-18 ENCOUNTER — RX CHANGE (OUTPATIENT)
Age: 29
End: 2024-01-18

## 2024-01-18 RX ORDER — PANTOPRAZOLE 40 MG/1
40 TABLET, DELAYED RELEASE ORAL
Qty: 90 | Refills: 3 | Status: ACTIVE | COMMUNITY
Start: 1900-01-01 | End: 1900-01-01

## 2024-01-18 RX ORDER — PANTOPRAZOLE 40 MG/1
40 TABLET, DELAYED RELEASE ORAL
Qty: 90 | Refills: 2 | Status: DISCONTINUED | COMMUNITY
End: 2024-01-18

## 2024-02-06 ENCOUNTER — NON-APPOINTMENT (OUTPATIENT)
Age: 29
End: 2024-02-06

## 2024-04-10 ENCOUNTER — RESULT REVIEW (OUTPATIENT)
Age: 29
End: 2024-04-10

## 2024-04-12 ENCOUNTER — APPOINTMENT (OUTPATIENT)
Dept: GASTROENTEROLOGY | Facility: CLINIC | Age: 29
End: 2024-04-12
Payer: COMMERCIAL

## 2024-04-12 VITALS
SYSTOLIC BLOOD PRESSURE: 98 MMHG | HEIGHT: 64 IN | WEIGHT: 159 LBS | DIASTOLIC BLOOD PRESSURE: 70 MMHG | BODY MASS INDEX: 27.14 KG/M2

## 2024-04-12 DIAGNOSIS — Z09 ENCOUNTER FOR FOLLOW-UP EXAMINATION AFTER COMPLETED TREATMENT FOR CONDITIONS OTHER THAN MALIGNANT NEOPLASM: ICD-10-CM

## 2024-04-12 DIAGNOSIS — K21.9 GASTRO-ESOPHAGEAL REFLUX DISEASE W/OUT ESOPHAGITIS: ICD-10-CM

## 2024-04-12 PROCEDURE — 99214 OFFICE O/P EST MOD 30 MIN: CPT

## 2024-04-12 RX ORDER — FAMOTIDINE 40 MG/1
40 TABLET, FILM COATED ORAL
Qty: 90 | Refills: 0 | Status: ACTIVE | COMMUNITY
Start: 2024-04-12 | End: 1900-01-01

## 2024-04-12 NOTE — PHYSICAL EXAM
[Alert] : alert [Normal Voice/Communication] : normal voice/communication [Healthy Appearing] : healthy appearing [Sclera] : the sclera and conjunctiva were normal [Hearing Threshold Finger Rub Not [place]] : hearing was normal [Normal Lips/Gums] : the lips and gums were normal [Normal Appearance] : the appearance of the neck was normal [No Respiratory Distress] : no respiratory distress [Auscultation Breath Sounds / Voice Sounds] : lungs were clear to auscultation bilaterally [Heart Rate And Rhythm] : heart rate was normal and rhythm regular [Normal S1, S2] : normal S1 and S2 [Bowel Sounds] : normal bowel sounds [Abdomen Tenderness] : non-tender [Abdomen Soft] : soft [Abnormal Walk] : normal gait [Normal Color / Pigmentation] : normal skin color and pigmentation [Oriented To Time, Place, And Person] : oriented to person, place, and time

## 2024-04-13 NOTE — ASSESSMENT
[FreeTextEntry1] : 29-year-old presents for follow up on GERD.   Plan:  Pt able to remain off pantoprazole for now. Will send patient famotidine for control of intermittent episodes. Pt to contact office if symptoms return, may need to resume pantoprazole. Pt encouraged to continue dietary modification and changes. Pt agrees to plan, all questions answered. Seen and discussed with Dr. Ching. I spent 35 minutes on this encounter.   famotidine for symptoms we discussed pantoprazole on demand therapy dp

## 2024-04-13 NOTE — HISTORY OF PRESENT ILLNESS
[FreeTextEntry1] : Addie Martinez is a 29-year-old female presents for follow up visit on GERD. Pt had been utilizing pantoprazole for management of GERD symptoms. Pt was on medication for a few months, then titrated off. Pt states she has been able to identify diary as a trigger of symptoms and has made changes and modifications in order to control. Pt does remain with intermittent episodes of GERD but better controlled with diet changes. Denies bowel complaints, typically has bowel movements regularly without significant straining or overt bleeding such as melena or hematochezia. Denies nausea, vomiting, or dysphagia. Denies unintentional weight loss.

## 2024-05-20 ENCOUNTER — APPOINTMENT (OUTPATIENT)
Dept: OBGYN | Facility: CLINIC | Age: 29
End: 2024-05-20
Payer: COMMERCIAL

## 2024-05-20 DIAGNOSIS — R87.612 LOW GRADE SQUAMOUS INTRAEPITHELIAL LESION ON CYTOLOGIC SMEAR OF CERVIX (LGSIL): ICD-10-CM

## 2024-05-20 PROCEDURE — 99203 OFFICE O/P NEW LOW 30 MIN: CPT

## 2024-05-20 PROCEDURE — 99459 PELVIC EXAMINATION: CPT

## 2024-05-23 DIAGNOSIS — Z83.79 FAMILY HISTORY OF OTHER DISEASES OF THE DIGESTIVE SYSTEM: ICD-10-CM

## 2024-05-23 DIAGNOSIS — Z87.59 PERSONAL HISTORY OF OTHER COMPLICATIONS OF PREGNANCY, CHILDBIRTH AND THE PUERPERIUM: ICD-10-CM

## 2024-05-23 NOTE — PLAN
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 see scanned in image patient to follow up for final decision for surgery all of her questions regarding procedure were answered she is agreeable with plan  I Kimi Tran [facility] am scribing for the presence of Dr. Young the following sections HISTORY OF PRESENT ILLNESS, PAST MEDICAL/FAMILY/SOCIAL HISTORY; REVIEW OF SYSTEMS; VITAL SIGNS; PHYSICAL EXAM; DISPOSITION. I personally performed the services described in the documentation, reviewed the documentation recorded by the scribe in my presence and it accurately and completely records my words and actions.

## 2024-05-23 NOTE — PHYSICAL EXAM
[Chaperone Present] : A chaperone was present in the examining room during all aspects of the physical examination [Labia Majora] : normal [Labia Minora] : normal [Scant] : There was scant vaginal bleeding [Normal] : normal [Uterine Adnexae] : normal [FreeTextEntry2] : Ngozi FNP-BC

## 2024-05-23 NOTE — HISTORY OF PRESENT ILLNESS
[FreeTextEntry1] : Patient is a 28 yo  female here today for initial visit to discuss surgical intervention for LSIL +HPV. 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  LMP today

## 2024-05-29 ENCOUNTER — APPOINTMENT (OUTPATIENT)
Dept: OBGYN | Facility: CLINIC | Age: 29
End: 2024-05-29
Payer: COMMERCIAL

## 2024-05-29 VITALS
SYSTOLIC BLOOD PRESSURE: 120 MMHG | WEIGHT: 151 LBS | BODY MASS INDEX: 25.78 KG/M2 | DIASTOLIC BLOOD PRESSURE: 80 MMHG | HEIGHT: 64 IN | HEART RATE: 78 BPM

## 2024-05-29 LAB — HEMOGLOBIN: 12.9

## 2024-05-29 PROCEDURE — ZZZZZ: CPT

## 2024-05-30 NOTE — ASU PATIENT PROFILE, ADULT - FALL HARM RISK - UNIVERSAL INTERVENTIONS
Bed in lowest position, wheels locked, appropriate side rails in place/Call bell, personal items and telephone in reach/Instruct patient to call for assistance before getting out of bed or chair/Non-slip footwear when patient is out of bed/[place] to call system/Physically safe environment - no spills, clutter or unnecessary equipment/Purposeful Proactive Rounding/Room/bathroom lighting operational, light cord in reach

## 2024-05-31 ENCOUNTER — RESULT REVIEW (OUTPATIENT)
Age: 29
End: 2024-05-31

## 2024-05-31 ENCOUNTER — APPOINTMENT (OUTPATIENT)
Dept: OBGYN | Facility: HOSPITAL | Age: 29
End: 2024-05-31

## 2024-05-31 ENCOUNTER — TRANSCRIPTION ENCOUNTER (OUTPATIENT)
Age: 29
End: 2024-05-31

## 2024-05-31 ENCOUNTER — OUTPATIENT (OUTPATIENT)
Dept: INPATIENT UNIT | Facility: HOSPITAL | Age: 29
LOS: 1 days | Discharge: HOME | End: 2024-05-31
Payer: COMMERCIAL

## 2024-05-31 VITALS
TEMPERATURE: 98 F | WEIGHT: 153 LBS | OXYGEN SATURATION: 100 % | DIASTOLIC BLOOD PRESSURE: 63 MMHG | HEIGHT: 64 IN | HEART RATE: 70 BPM | SYSTOLIC BLOOD PRESSURE: 103 MMHG | RESPIRATION RATE: 14 BRPM

## 2024-05-31 VITALS
OXYGEN SATURATION: 99 % | HEART RATE: 66 BPM | RESPIRATION RATE: 16 BRPM | DIASTOLIC BLOOD PRESSURE: 60 MMHG | SYSTOLIC BLOOD PRESSURE: 106 MMHG

## 2024-05-31 DIAGNOSIS — R87.612 LOW GRADE SQUAMOUS INTRAEPITHELIAL LESION ON CYTOLOGIC SMEAR OF CERVIX (LGSIL): ICD-10-CM

## 2024-05-31 DIAGNOSIS — R87.613 HIGH GRADE SQUAMOUS INTRAEPITHELIAL LESION ON CYTOLOGIC SMEAR OF CERVIX (HGSIL): ICD-10-CM

## 2024-05-31 DIAGNOSIS — R87.810 CERVICAL HIGH RISK HUMAN PAPILLOMAVIRUS (HPV) DNA TEST POSITIVE: ICD-10-CM

## 2024-05-31 DIAGNOSIS — Z98.890 OTHER SPECIFIED POSTPROCEDURAL STATES: Chronic | ICD-10-CM

## 2024-05-31 LAB
BLD GP AB SCN SERPL QL: SIGNIFICANT CHANGE UP
GLUCOSE BLDC GLUCOMTR-MCNC: 94 MG/DL — SIGNIFICANT CHANGE UP (ref 70–99)
HCG UR QL: NEGATIVE — SIGNIFICANT CHANGE UP

## 2024-05-31 PROCEDURE — 57461 CONZ OF CERVIX W/SCOPE LEEP: CPT

## 2024-05-31 PROCEDURE — 88342 IMHCHEM/IMCYTCHM 1ST ANTB: CPT

## 2024-05-31 PROCEDURE — 88307 TISSUE EXAM BY PATHOLOGIST: CPT

## 2024-05-31 PROCEDURE — 86900 BLOOD TYPING SEROLOGIC ABO: CPT

## 2024-05-31 PROCEDURE — 88305 TISSUE EXAM BY PATHOLOGIST: CPT | Mod: 26

## 2024-05-31 PROCEDURE — 36415 COLL VENOUS BLD VENIPUNCTURE: CPT

## 2024-05-31 PROCEDURE — 88342 IMHCHEM/IMCYTCHM 1ST ANTB: CPT | Mod: 26

## 2024-05-31 PROCEDURE — 88305 TISSUE EXAM BY PATHOLOGIST: CPT

## 2024-05-31 PROCEDURE — 88307 TISSUE EXAM BY PATHOLOGIST: CPT | Mod: 26

## 2024-05-31 PROCEDURE — 82962 GLUCOSE BLOOD TEST: CPT

## 2024-05-31 PROCEDURE — 86850 RBC ANTIBODY SCREEN: CPT

## 2024-05-31 PROCEDURE — 81025 URINE PREGNANCY TEST: CPT

## 2024-05-31 PROCEDURE — 86901 BLOOD TYPING SEROLOGIC RH(D): CPT

## 2024-05-31 RX ORDER — ACETAMINOPHEN 500 MG
1000 TABLET ORAL ONCE
Refills: 0 | Status: COMPLETED | OUTPATIENT
Start: 2024-05-31 | End: 2024-05-31

## 2024-05-31 RX ORDER — SODIUM CHLORIDE 9 MG/ML
1000 INJECTION, SOLUTION INTRAVENOUS
Refills: 0 | Status: DISCONTINUED | OUTPATIENT
Start: 2024-05-31 | End: 2024-05-31

## 2024-05-31 RX ORDER — FENTANYL CITRATE 50 UG/ML
25 INJECTION INTRAVENOUS
Refills: 0 | Status: DISCONTINUED | OUTPATIENT
Start: 2024-05-31 | End: 2024-05-31

## 2024-05-31 RX ORDER — OXYCODONE HYDROCHLORIDE 5 MG/1
5 TABLET ORAL ONCE
Refills: 0 | Status: DISCONTINUED | OUTPATIENT
Start: 2024-05-31 | End: 2024-05-31

## 2024-05-31 RX ADMIN — Medication 400 MILLIGRAM(S): at 14:23

## 2024-05-31 RX ADMIN — SODIUM CHLORIDE 75 MILLILITER(S): 9 INJECTION, SOLUTION INTRAVENOUS at 14:23

## 2024-05-31 NOTE — BRIEF OPERATIVE NOTE - NSICDXBRIEFPROCEDURE_GEN_ALL_CORE_FT
PROCEDURES:  Exam under anesthesia, gynecologic 31-May-2024 13:04:46  Jean Young  Colposcopy, cervix 31-May-2024 13:04:53  Jean Young  LEEP procedure 31-May-2024 13:05:00  Jean Young  Curettage, endocervical 31-May-2024 13:05:07  Jean Yuong

## 2024-05-31 NOTE — ASU DISCHARGE PLAN (ADULT/PEDIATRIC) - B. DRINK ALCOHOL, BEER, OR WINE
We evaluated you for your pain after a fall.  We did not find any injuries.  Please use the lidocaine as needed for symptoms.    Please follow closely with your primary care doctor as well as orthopedic doctor.    Please return to the emergency department for any worsening or concerning symptoms.  
Statement Selected

## 2024-05-31 NOTE — PLAN
[FreeTextEntry1] : Discussed pre op and post op instructions in detail. Vaginal restrictions post op only all of patients questions regarding procedure were answered. consent signed by MD, patient and witness. she is to f/u with me 4 weeks post operatively. she is agreeable with plan.  I Yudi AGOSTO am scribing for the presence of Dr. Young the following sections HISTORY OF PRESENT ILLNESS, PAST MEDICAL/FAMILY/SOCIAL HISTORY; REVIEW OF SYSTEMS; VITAL SIGNS; PHYSICAL EXAM; DISPOSITION.    I personally performed the services described in the documentation, reviewed the documentation recorded by the scribe in my presence and it accurately and completely records my words and actions.

## 2024-05-31 NOTE — ASU DISCHARGE PLAN (ADULT/PEDIATRIC) - CARE PROVIDER_API CALL
Jean Young  Obstetrics and Gynecology  [address] [place] [id number]  Phone: [phone]  Fax: [phone]  Follow Up Time: 1 month

## 2024-05-31 NOTE — PHYSICAL EXAM
[Chaperone Present] : A chaperone was present in the examining room during all aspects of the physical examination [Appropriately responsive] : appropriately responsive [Alert] : alert [No Acute Distress] : no acute distress [No Lymphadenopathy] : no lymphadenopathy [Soft] : soft [Non-tender] : non-tender [Non-distended] : non-distended [No HSM] : No HSM [No Lesions] : no lesions [No Mass] : no mass [Oriented x3] : oriented x3 [Labia Majora] : normal [Labia Minora] : normal [Normal] : normal [Uterine Adnexae] : normal [FreeTextEntry2] : JAILENE BeltranC

## 2024-06-07 LAB — SURGICAL PATHOLOGY STUDY: SIGNIFICANT CHANGE UP

## 2024-06-28 ENCOUNTER — APPOINTMENT (OUTPATIENT)
Dept: OBGYN | Facility: CLINIC | Age: 29
End: 2024-06-28
Payer: COMMERCIAL

## 2024-06-28 VITALS — SYSTOLIC BLOOD PRESSURE: 108 MMHG | HEIGHT: 64 IN | DIASTOLIC BLOOD PRESSURE: 75 MMHG | HEART RATE: 73 BPM

## 2024-06-28 LAB — HEMOGLOBIN: 13.5

## 2024-06-28 PROCEDURE — ZZZZZ: CPT

## 2024-07-09 ENCOUNTER — RX RENEWAL (OUTPATIENT)
Age: 29
End: 2024-07-09

## 2024-08-14 ENCOUNTER — EMERGENCY (EMERGENCY)
Facility: HOSPITAL | Age: 29
LOS: 0 days | Discharge: HOME | End: 2024-08-14
Attending: EMERGENCY MEDICINE
Payer: COMMERCIAL

## 2024-08-14 VITALS
SYSTOLIC BLOOD PRESSURE: 105 MMHG | RESPIRATION RATE: 18 BRPM | HEART RATE: 70 BPM | OXYGEN SATURATION: 100 % | TEMPERATURE: 98 F | DIASTOLIC BLOOD PRESSURE: 61 MMHG

## 2024-08-14 VITALS — HEIGHT: 64 IN | WEIGHT: 153.22 LBS

## 2024-08-14 DIAGNOSIS — M25.561 PAIN IN RIGHT KNEE: ICD-10-CM

## 2024-08-14 DIAGNOSIS — S86.911A STRAIN OF UNSPECIFIED MUSCLE(S) AND TENDON(S) AT LOWER LEG LEVEL, RIGHT LEG, INITIAL ENCOUNTER: ICD-10-CM
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
## 2024-08-14 PROCEDURE — 99283 EMERGENCY DEPT VISIT LOW MDM: CPT | Mod: 25

## 2024-08-14 PROCEDURE — 99284 EMERGENCY DEPT VISIT MOD MDM: CPT

## 2024-08-14 PROCEDURE — 73562 X-RAY EXAM OF KNEE 3: CPT | Mod: 26,RT

## 2024-08-14 PROCEDURE — 73562 X-RAY EXAM OF KNEE 3: CPT | Mod: RT

## 2024-08-14 NOTE — ED STATDOCS - OBJECTIVE STATEMENT
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 Pt reports she is in the [employer]. Pt reports she was pushing a sled and felt a pop in her right knee. No other complaints at this time.

## 2024-08-14 NOTE — ED STATDOCS - ATTENDING APP SHARED VISIT CONTRIBUTION OF CARE
I personally saw the patient with the RED, and completed the key components of the history and physical exam. I then discussed the management plan with the RED.

## 2024-08-14 NOTE — ED ADULT NURSE NOTE - OBJECTIVE STATEMENT
28yo Female co R knee pain/injury s/p pushing a sled at the [employer] and felt a pop in her R knee.

## 2024-08-14 NOTE — ED ADULT NURSE NOTE - NSFALLUNIVINTERV_ED_ALL_ED
Bed/Stretcher in lowest position, wheels locked, appropriate side rails in place/Call bell, personal items and telephone in reach/Instruct patient to call for assistance before getting out of bed/chair/stretcher/Non-slip footwear applied when patient is off stretcher/Ethel to call system/Physically safe environment - no spills, clutter or unnecessary equipment/Purposeful proactive rounding/Room/bathroom lighting operational, light cord in reach

## 2024-08-14 NOTE — ED STATDOCS - PROGRESS NOTE DETAILS
Patient seen and evaluated, ED attending note and orders reviewed, will continue with patient follow up and care -Rosmery Tyler PA-C X-ray negative for fracture, patient placed in knee immobilizer for comfort.  Advised rest ice elevation compression, ibuprofen as needed.  Patient has seen Dr. Caicedo in the past states she will follow-up with him for further evaluation.   patient able to ambulate. Will discharge home with outpatient follow-up return precautions given  Rosmery Tyler PA-C

## 2024-08-14 NOTE — ED STATDOCS - CARE PROVIDER_API CALL
Michael Caicedo  Orthopaedic Surgery  [address] [place] [id number]  Phone: [phone]  Fax: [phone]  Established Patient  Follow Up Time:

## 2024-08-14 NOTE — ED ADULT TRIAGE NOTE - CHIEF COMPLAINT QUOTE
Pt presents ambulatory to ED c/o right knee. pt reports yesterday she pushed a heavy machine and in doing so felt a pop to her right knee. pt now having difficulty ambulating

## 2024-08-14 NOTE — ED STATDOCS - PATIENT PORTAL LINK FT
You can access the FollowMyHealth Patient Portal offered by [facility] by registering at the following website: [email or web address]. By joining [email or web address]’s FollowMyHealth portal, you will also be able to view your health information using other applications (apps) compatible with our system.

## 2024-08-14 NOTE — ED STATDOCS - DISCHARGE DATE
Ondansetron (Zofran) 4 mg    Last office visit 7/27/23, med-check.    Upcoming visit 1/14/24, cpe.    Last refill 9/5/23.    Medication was last filled by  provider for 10 tablets due to an acute issue.    Refill request denied-- not on current med list & was only prescribed for acute issue.   14-Aug-2024

## 2024-08-14 NOTE — ED STATDOCS - NSFOLLOWUPINSTRUCTIONS_ED_ALL_ED_FT
you may take ibuprofen 400mg every 6 hours as needed with food       Muscle Strain    WHAT YOU NEED TO KNOW:    A muscle strain is a twist, pull, or tear of a muscle or tendon. A tendon is a strong elastic tissue that connects a muscle to a bone. Signs of a strained muscle include bruising and swelling over the area, pain with movement, and loss of strength.          DISCHARGE INSTRUCTIONS:    Return to the emergency department if:     You suddenly cannot feel or move your injured muscle.        Contact your healthcare provider if:     Your pain and swelling worsen or do not go away.       You have questions or concerns about your condition or care.    Medicines:     NSAIDs help decrease swelling and pain or fever. This medicine is available with or without a doctor's order. NSAIDs can cause stomach bleeding or kidney problems in certain people. If you take blood thinner medicine, always ask your healthcare provider if NSAIDs are safe for you. Always read the medicine label and follow directions.      Muscle relaxers help decrease pain and muscle spasms.      Take your medicine as directed. Contact your healthcare provider if you think your medicine is not helping or if you have side effects. Tell him of her if you are allergic to any medicine. Keep a list of the medicines, vitamins, and herbs you take. Include the amounts, and when and why you take them. Bring the list or the pill bottles to follow-up visits. Carry your medicine list with you in case of an emergency.    Follow up with your healthcare provider as directed: Your healthcare provider may suggest that you have a follow-up visit before you go back to your usual activity. Write down your questions so you remember to ask them during your visits.    Self-care:     3 to 7 days after the injury: Use Rest, Ice, Compression, and Elevation (RICE) to help stop bruising and decrease pain and swelling.  Rest: Rest your muscle to allow your injury to heal. When the pain decreases, begin normal, slow movements. For mild and moderate muscle strains, you should rest your muscles for about 2 days. However, if you have a severe muscle strain, you should rest for 10 to 14 days. You may need to use crutches to walk if your muscle strain is in your legs or lower body.       Ice: Put an ice pack on the injured area. Put a towel between the ice pack and your skin. Do not put the ice pack directly on your skin. You can use a package of frozen peas instead of an ice pack.      Compression: You may need to wrap an elastic bandage around the area to decrease swelling. It should be tight enough for you to feel support. Do not wrap it too tightly.       Elevation: Keep the injured muscle raised above your heart if possible. For example if you have a strain of your lower leg muscle, lie down and prop your leg up on pillows. This helps decrease pain and swelling.      3 to 21 days after the injury: Start to slowly and regularly exercise your muscle. This will help it heal. If you feel pain, decrease how hard you are exercising.       1 to 6 weeks after the injury: Stretch the injured muscle. Hold the stretch for about 30 seconds. Do this 4 times a day. You may stretch the muscle until you feel a slight pull. Stop stretching if you feel pain.       2 weeks to 6 months after the injury: The goal of this phase is to return to the activity you were doing before the injury happened, without hurting the muscle again.       3 weeks to 6 months after the injury: Keep stretching and strengthening your muscles to avoid injury. Slowly increase the time and distance that you exercise. You may have signs and symptoms of muscle strain 6 months after the injury, even if you do things to help it heal. In this case, you may need surgery on the muscle.

## 2024-08-15 ENCOUNTER — NON-APPOINTMENT (OUTPATIENT)
Age: 29
End: 2024-08-15

## 2024-08-15 ENCOUNTER — APPOINTMENT (OUTPATIENT)
Dept: ORTHOPEDIC SURGERY | Facility: CLINIC | Age: 29
End: 2024-08-15
Payer: COMMERCIAL

## 2024-08-15 DIAGNOSIS — M76.31 ILIOTIBIAL BAND SYNDROME, RIGHT LEG: ICD-10-CM

## 2024-08-15 PROCEDURE — 99214 OFFICE O/P EST MOD 30 MIN: CPT

## 2024-08-15 NOTE — DISCUSSION/SUMMARY
[de-identified] : We had a thorough discussion regarding the nature of her pain, the pathophysiology, as well as all treatment options. I discussed operative and non-operative treatment modalities. Considering the patient's current presentation of pain, physical exam, and radiographs an MRI is indicated at this time. A prescription for this was given to the patient. We will go over the MRI results with her upon obtaining the results in the office and advise her of further treatment options. She agrees with the above plan and all questions were answered.

## 2024-08-15 NOTE — DISCUSSION/SUMMARY
[de-identified] : We had a thorough discussion regarding the nature of her pain, the pathophysiology, as well as all treatment options. I discussed operative and non-operative treatment modalities. Considering the patient's current presentation of pain, physical exam, and radiographs an MRI is indicated at this time. A prescription for this was given to the patient. We will go over the MRI results with her upon obtaining the results in the office and advise her of further treatment options. She agrees with the above plan and all questions were answered.

## 2024-08-15 NOTE — CONSULT LETTER
[Dear  ___] : Dear  [unfilled], [Consult Letter:] : I had the pleasure of evaluating your patient, [unfilled]. [Please see my note below.] : Please see my note below. [Sincerely,] : Sincerely, [FreeTextEntry3] : Dr. Chuck Finch

## 2024-08-15 NOTE — ADDENDUM
[FreeTextEntry1] : Documented by Magdalena Tamayo acting as a scribe for Dr. Finch and Jean Hawthorne PA-C on 08/15/2024. All medical record entries made by the Scribe were at my, Dr. Finch, and Jean Hawthorne's, direction and personally dictated by me on 08/15/2024. I have reviewed the chart and agree that the record accurately reflects my personal performance of the history, physical exam, procedure and imaging.  Suturegard Retention Suture: 2-0 Nylon

## 2024-08-15 NOTE — HISTORY OF PRESENT ILLNESS
[Worsening] : worsening [___ days] : [unfilled] day(s) ago [de-identified] : DAVID CHO is a 29 year old female being seen for right knee pain. She states she is currently in the [place] [employer]. She states two days ago, she was training for her performance test when she felt a pop in her knee performing a cutting motion. She states she was able to finish her activity. She states shortly afterwards she experienced increased pain. She points to the lateral aspect of her leg as the point of her pain. She states she went to [facility] due to her pain. She received X-rays and a knee immobilizer. She reports having difficulty navigating stairs and daily movements. She presents today FWB in a knee immobilizer with an X-ray from [facility].

## 2024-08-15 NOTE — PHYSICAL EXAM
----- Message from Reece Wright MD sent at 1/4/2021 12:11 PM CST -----  Please let him know that the recent CT scan of the neck did not show anything suggestive of cancer.  The scan remains abnormal but this is very common after having had previous surgery and radiation therapy.  I would like to see him back in the office to reexamine him as scheduled.   [de-identified] : General: Well appearing, no acute distress Neurologic: A&Ox3, No focal deficits Head: NCAT without abrasions, lacerations, or ecchymosis to head, face, or scalp Eyes: No scleral icterus, no gross abnormalities Respiratory: Equal chest wall expansion bilaterally, no accessory muscle use Lymphatic: No lymphadenopathy palpated Skin: Warm and dry Psychiatric: Normal mood and affect  The right knee is examined and reveals no swelling, ecchymosis, erythema, or deformity. The patients range of motion is from 0-130 degrees passively. However, patient sits comfortabley at +10 degrees of extension. There is no crepitus felt. The patient has no tenderness to palpation in the medial or lateral joint lines. There is no patellar facet tenderness. The patient has 5/5 strength to resisted hip flexion, VMO isolation, knee flexion and extension. The patient is stable to anterior and posterior draw. The patient is stable to Lochman testing. There is no valgus or varus ligamentous instability. Negative Garry and Grind tests. There is no pain with patellar mobility or apprehension testing. The calf and thigh are soft, supple, and nontender. The patient is grossly neurovascularly intact distally. [de-identified] : XRAY [facility] ACC: [id number] EXAM: XR KNEE 3 VIEWS RT ORDERED BY: MARICARMEN WILSON PROCEDURE DATE: 08/14/2024  IMPRESSION: Negative study.

## 2024-08-15 NOTE — PHYSICAL EXAM
[de-identified] : General: Well appearing, no acute distress Neurologic: A&Ox3, No focal deficits Head: NCAT without abrasions, lacerations, or ecchymosis to head, face, or scalp Eyes: No scleral icterus, no gross abnormalities Respiratory: Equal chest wall expansion bilaterally, no accessory muscle use Lymphatic: No lymphadenopathy palpated Skin: Warm and dry Psychiatric: Normal mood and affect  The right knee is examined and reveals no swelling, ecchymosis, erythema, or deformity. The patients range of motion is from 0-130 degrees passively. However, patient sits comfortabley at +10 degrees of extension. There is no crepitus felt. The patient has no tenderness to palpation in the medial or lateral joint lines. There is no patellar facet tenderness. The patient has 5/5 strength to resisted hip flexion, VMO isolation, knee flexion and extension. The patient is stable to anterior and posterior draw. The patient is stable to Lochman testing. There is no valgus or varus ligamentous instability. Negative Garry and Grind tests. There is no pain with patellar mobility or apprehension testing. The calf and thigh are soft, supple, and nontender. The patient is grossly neurovascularly intact distally. [de-identified] : XRAY [facility] ACC: [id number] EXAM: XR KNEE 3 VIEWS RT ORDERED BY: MARICARMEN WILSON PROCEDURE DATE: 08/14/2024  IMPRESSION: Negative study.

## 2024-08-15 NOTE — HISTORY OF PRESENT ILLNESS
[Worsening] : worsening [___ days] : [unfilled] day(s) ago [de-identified] : DAVID CHO is a 29 year old female being seen for right knee pain. She states she is currently in the [place] [employer]. She states two days ago, she was training for her performance test when she felt a pop in her knee performing a cutting motion. She states she was able to finish her activity. She states shortly afterwards she experienced increased pain. She points to the lateral aspect of her leg as the point of her pain. She states she went to [facility] due to her pain. She received X-rays and a knee immobilizer. She reports having difficulty navigating stairs and daily movements. She presents today FWB in a knee immobilizer with an X-ray from [facility].

## 2024-08-15 NOTE — ADDENDUM
[FreeTextEntry1] : Documented by Magdalena Tamayo acting as a scribe for Dr. Finch and Jean Hawthorne PA-C on 08/15/2024. All medical record entries made by the Scribe were at my, Dr. Finch, and Jean Hawthorne's, direction and personally dictated by me on 08/15/2024. I have reviewed the chart and agree that the record accurately reflects my personal performance of the history, physical exam, procedure and imaging.

## 2024-08-20 ENCOUNTER — TRANSCRIPTION ENCOUNTER (OUTPATIENT)
Age: 29
End: 2024-08-20

## 2024-08-20 ENCOUNTER — APPOINTMENT (OUTPATIENT)
Dept: MRI IMAGING | Facility: CLINIC | Age: 29
End: 2024-08-20

## 2024-08-20 ENCOUNTER — OUTPATIENT (OUTPATIENT)
Dept: OUTPATIENT SERVICES | Facility: HOSPITAL | Age: 29
LOS: 1 days | End: 2024-08-20
Payer: COMMERCIAL

## 2024-08-20 DIAGNOSIS — Z98.890 OTHER SPECIFIED POSTPROCEDURAL STATES: Chronic | ICD-10-CM

## 2024-08-20 DIAGNOSIS — M76.31 ILIOTIBIAL BAND SYNDROME, RIGHT LEG: ICD-10-CM

## 2024-08-20 PROCEDURE — 73721 MRI JNT OF LWR EXTRE W/O DYE: CPT

## 2024-08-20 PROCEDURE — 73721 MRI JNT OF LWR EXTRE W/O DYE: CPT | Mod: 26,RT

## 2024-09-03 ENCOUNTER — APPOINTMENT (OUTPATIENT)
Dept: ORTHOPEDIC SURGERY | Facility: CLINIC | Age: 29
End: 2024-09-03
Payer: COMMERCIAL

## 2024-09-03 DIAGNOSIS — S83.281D OTHER TEAR OF LATERAL MENISCUS, CURRENT INJURY, RIGHT KNEE, SUBSEQUENT ENCOUNTER: ICD-10-CM

## 2024-09-03 PROCEDURE — 99214 OFFICE O/P EST MOD 30 MIN: CPT

## 2024-09-03 RX ORDER — MELOXICAM 15 MG/1
15 TABLET ORAL
Qty: 21 | Refills: 2 | Status: ACTIVE | COMMUNITY
Start: 2024-09-03 | End: 1900-01-01

## 2024-09-03 NOTE — HISTORY OF PRESENT ILLNESS
[de-identified] : DAVID CHO is a 29 year old female being seen for follow up due to right knee pain. Patient presents today for MRI review of her R knee from [facility]. She presents today FWB in a knee brace.

## 2024-09-03 NOTE — DISCUSSION/SUMMARY
[de-identified] : We had a thorough discussion regarding the nature of her pain, the pathophysiology, as well as all treatment options. I discussed operative and non-operative treatment modalities. A prescription of Meloxicam was given to be taken as directed with food to prevent GI upset, if occurs pt to D/C and call us at that time. Patient was given prescription of formal physical therapy that she will perform 2x/wk for 6-8 wks. All risks, benefits and alternatives to the proposed surgical procedure, right knee arthroscopy with meniscus repair, were discussed in great detail with the patient. Risks include but are not limited to pain, bleeding, infection, stiffness, medical complications (including DVT, PE, MI), and risks of anesthesia. She does not want to proceed with a surgical procedure at this time. The patient expressed understanding and all questions were answered. Patient will follow up in 4 wks for repeat clinical assessment.

## 2024-09-03 NOTE — ADDENDUM
[FreeTextEntry1] : Documented by Magdalena Tamayo acting as a scribe for Dr. Finch and Jean Hawthorne PA-C on 09/03/2024. All medical record entries made by the Scribe were at my, Dr. Finch, and Jean Hawthorne's, direction and personally dictated by me on 09/03/2024. I have reviewed the chart and agree that the record accurately reflects my personal performance of the history, physical exam, procedure and imaging.

## 2024-09-03 NOTE — HISTORY OF PRESENT ILLNESS
[de-identified] : DAVID CHO is a 29 year old female being seen for follow up due to right knee pain. Patient presents today for MRI review of her R knee from [facility]. She presents today FWB in a knee brace.

## 2024-09-03 NOTE — PHYSICAL EXAM
[de-identified] : General: Well appearing, no acute distress Neurologic: A&Ox3, No focal deficits Head: NCAT without abrasions, lacerations, or ecchymosis to head, face, or scalp Eyes: No scleral icterus, no gross abnormalities Respiratory: Equal chest wall expansion bilaterally, no accessory muscle use Lymphatic: No lymphadenopathy palpated Skin: Warm and dry Psychiatric: Normal mood and affect  The right knee is examined and reveals no swelling, ecchymosis, erythema, or deformity. The patients range of motion is from 0-130 degrees passively. However, patient sits comfortably at +10 degrees of extension. There is no crepitus felt. The patient has no tenderness to palpation in the medial or lateral joint lines. There is no patellar facet tenderness. The patient has 5/5 strength to resisted hip flexion, VMO isolation, knee flexion and extension. The patient is stable to anterior and posterior draw. The patient is stable to Lochman testing. There is no valgus or varus ligamentous instability. Negative Garry and Grind tests. There is no pain with patellar mobility or apprehension testing. The calf and thigh are soft, supple, and nontender. The patient is grossly neurovascularly intact distally. [de-identified] : EXAM: [id number] - MR KNEE RT  - ORDERED BY:  CAROLINE GOMEZ DOS: 08/20/2024  IMPRESSION:  MRI of the right knee demonstrates a discoid lateral meniscus with anterior horn undersurface fraying, without tear. Low-grade injury is to the lateral collateral complex. Small multiseptated popliteal cyst.  *** ADDENDUM # 1 *** There is signal abnormality within the fatty soft tissues interposed between the iliotibial band distally and attachment to the tibia which can be seen in the setting of iliotibial band syndrome.  Of note, meniscus tear present on MRI.

## 2024-09-03 NOTE — DISCUSSION/SUMMARY
[de-identified] : We had a thorough discussion regarding the nature of her pain, the pathophysiology, as well as all treatment options. I discussed operative and non-operative treatment modalities. A prescription of Meloxicam was given to be taken as directed with food to prevent GI upset, if occurs pt to D/C and call us at that time. Patient was given prescription of formal physical therapy that she will perform 2x/wk for 6-8 wks. All risks, benefits and alternatives to the proposed surgical procedure, right knee arthroscopy with meniscus repair, were discussed in great detail with the patient. Risks include but are not limited to pain, bleeding, infection, stiffness, medical complications (including DVT, PE, MI), and risks of anesthesia. She does not want to proceed with a surgical procedure at this time. The patient expressed understanding and all questions were answered. Patient will follow up in 4 wks for repeat clinical assessment.

## 2024-09-03 NOTE — PHYSICAL EXAM
[de-identified] : General: Well appearing, no acute distress Neurologic: A&Ox3, No focal deficits Head: NCAT without abrasions, lacerations, or ecchymosis to head, face, or scalp Eyes: No scleral icterus, no gross abnormalities Respiratory: Equal chest wall expansion bilaterally, no accessory muscle use Lymphatic: No lymphadenopathy palpated Skin: Warm and dry Psychiatric: Normal mood and affect  The right knee is examined and reveals no swelling, ecchymosis, erythema, or deformity. The patients range of motion is from 0-130 degrees passively. However, patient sits comfortably at +10 degrees of extension. There is no crepitus felt. The patient has no tenderness to palpation in the medial or lateral joint lines. There is no patellar facet tenderness. The patient has 5/5 strength to resisted hip flexion, VMO isolation, knee flexion and extension. The patient is stable to anterior and posterior draw. The patient is stable to Lochman testing. There is no valgus or varus ligamentous instability. Negative Garry and Grind tests. There is no pain with patellar mobility or apprehension testing. The calf and thigh are soft, supple, and nontender. The patient is grossly neurovascularly intact distally. [de-identified] : EXAM: [id number] - MR KNEE RT  - ORDERED BY:  CAROLINE GOMEZ DOS: 08/20/2024  IMPRESSION:  MRI of the right knee demonstrates a discoid lateral meniscus with anterior horn undersurface fraying, without tear. Low-grade injury is to the lateral collateral complex. Small multiseptated popliteal cyst.  *** ADDENDUM # 1 *** There is signal abnormality within the fatty soft tissues interposed between the iliotibial band distally and attachment to the tibia which can be seen in the setting of iliotibial band syndrome.  Of note, meniscus tear present on MRI.

## 2024-09-12 NOTE — ED PROVIDER NOTE - NSICDXPASTMEDICALHX_GEN_ALL_CORE_FT
Is This A New Presentation, Or A Follow-Up?: Rash
How Severe Is Your Rash?: moderate
PAST MEDICAL HISTORY:  Ankle injury left    Ankle pain left    No pertinent past medical history     Osteochondral talar dome lesion

## 2024-10-01 ENCOUNTER — APPOINTMENT (OUTPATIENT)
Dept: ORTHOPEDIC SURGERY | Facility: CLINIC | Age: 29
End: 2024-10-01
Payer: COMMERCIAL

## 2024-10-01 DIAGNOSIS — S83.281D OTHER TEAR OF LATERAL MENISCUS, CURRENT INJURY, RIGHT KNEE, SUBSEQUENT ENCOUNTER: ICD-10-CM

## 2024-10-01 PROCEDURE — 99214 OFFICE O/P EST MOD 30 MIN: CPT | Mod: 25

## 2024-10-01 PROCEDURE — 20611 DRAIN/INJ JOINT/BURSA W/US: CPT | Mod: RT

## 2024-10-02 NOTE — PROCEDURE
[de-identified] : Injection: US guided Right knee joint.  A discussion was had with the patient regarding this procedure and all questions were answered. All risks, benefits and alternatives were discussed. These included but were not limited to bleeding, infection, and allergic reaction. Alcohol was used to clean the skin, and chlorhexidine was used to sterilize and prep the area in the supero-lateral aspect of the right knee. Ethyl chloride spray was then used as a topical anesthetic. A 22-gauge needle was used to inject 3cc of 1% Xylocaine, 2cc of 0.25% Bupivacaine and 1cc of 40mg/mL Triamcinolone Acetonide into the right knee. Ultrasound guidance was used for adequate placement of needle.  A sterile band aid was then applied. The patient tolerated the procedure well and there were no complications. Post injection instructions were given.

## 2024-10-02 NOTE — PROCEDURE
[de-identified] : Injection: US guided Right knee joint.  A discussion was had with the patient regarding this procedure and all questions were answered. All risks, benefits and alternatives were discussed. These included but were not limited to bleeding, infection, and allergic reaction. Alcohol was used to clean the skin, and chlorhexidine was used to sterilize and prep the area in the supero-lateral aspect of the right knee. Ethyl chloride spray was then used as a topical anesthetic. A 22-gauge needle was used to inject 3cc of 1% Xylocaine, 2cc of 0.25% Bupivacaine and 1cc of 40mg/mL Triamcinolone Acetonide into the right knee. Ultrasound guidance was used for adequate placement of needle.  A sterile band aid was then applied. The patient tolerated the procedure well and there were no complications. Post injection instructions were given.

## 2024-10-02 NOTE — DISCUSSION/SUMMARY
[de-identified] : We had a thorough discussion regarding the nature of her pain, the pathophysiology, as well as all treatment options. I discussed operative and non-operative treatment modalities. Pt due to her acute pain elected for a corticosteroid injection at today's visit and tolerated the procedure well. She should take it easy for the next 2-3 days while icing the joint. Patient was given prescription of formal physical therapy that she will perform 2x/wk for 6-8 wks. All questions were answered and the patient verbalized understanding. The patient is in agreement with this treatment plan. Patient will follow up in 4 wks for repeat clinical assessment.

## 2024-10-02 NOTE — HISTORY OF PRESENT ILLNESS
[de-identified] : DAVID CHO is a 29 year old female being seen for follow up due to right knee pain. She presents today FWB in a knee brace. She states she is currently in the [employer]. 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

## 2024-10-02 NOTE — DISCUSSION/SUMMARY
[de-identified] : We had a thorough discussion regarding the nature of her pain, the pathophysiology, as well as all treatment options. I discussed operative and non-operative treatment modalities. Pt due to her acute pain elected for a corticosteroid injection at today's visit and tolerated the procedure well. She should take it easy for the next 2-3 days while icing the joint. Patient was given prescription of formal physical therapy that she will perform 2x/wk for 6-8 wks. All questions were answered and the patient verbalized understanding. The patient is in agreement with this treatment plan. Patient will follow up in 4 wks for repeat clinical assessment.

## 2024-10-02 NOTE — PHYSICAL EXAM
[de-identified] : General: Well appearing, no acute distress Neurologic: A&Ox3, No focal deficits Head: NCAT without abrasions, lacerations, or ecchymosis to head, face, or scalp Eyes: No scleral icterus, no gross abnormalities Respiratory: Equal chest wall expansion bilaterally, no accessory muscle use Lymphatic: No lymphadenopathy palpated Skin: Warm and dry Psychiatric: Normal mood and affect  The right knee is examined and reveals no swelling, ecchymosis, erythema, or deformity. The patients range of motion is from 0-130 degrees passively. However, patient sits comfortably at +10 degrees of extension. There is no crepitus felt. The patient has no tenderness to palpation in the medial or lateral joint lines. There is no patellar facet tenderness. The patient has 5/5 strength to resisted hip flexion, VMO isolation, knee flexion and extension. The patient is stable to anterior and posterior draw. The patient is stable to Lochman testing. There is no valgus or varus ligamentous instability. Negative Garry and Grind tests. There is no pain with patellar mobility or apprehension testing. The calf and thigh are soft, supple, and nontender. The patient is grossly neurovascularly intact distally. [de-identified] : No new imaging.

## 2024-10-02 NOTE — ADDENDUM
[FreeTextEntry1] : Documented by Magdalena Tmaayo acting as a scribe for Dr. Finch and Jean Hawthorne PA-C on 10/01/2024. All medical record entries made by the Scribe were at my, Dr. Finch, and Jean Hawthorne's, direction and personally dictated by me on 10/01/2024. I have reviewed the chart and agree that the record accurately reflects my personal performance of the history, physical exam, procedure and imaging.

## 2024-10-02 NOTE — HISTORY OF PRESENT ILLNESS
[de-identified] : DAVID CHO is a 29 year old female being seen for follow up due to right knee pain. She presents today FWB in a knee brace. She states she is currently in the [employer]. 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

## 2024-10-02 NOTE — ADDENDUM
[FreeTextEntry1] : Documented by Magdalena Tamayo acting as a scribe for Dr. Finch and Jean Hawthorne PA-C on 10/01/2024. All medical record entries made by the Scribe were at my, Dr. Finch, and Jean Hawthorne's, direction and personally dictated by me on 10/01/2024. I have reviewed the chart and agree that the record accurately reflects my personal performance of the history, physical exam, procedure and imaging.

## 2024-10-02 NOTE — PHYSICAL EXAM
[de-identified] : General: Well appearing, no acute distress Neurologic: A&Ox3, No focal deficits Head: NCAT without abrasions, lacerations, or ecchymosis to head, face, or scalp Eyes: No scleral icterus, no gross abnormalities Respiratory: Equal chest wall expansion bilaterally, no accessory muscle use Lymphatic: No lymphadenopathy palpated Skin: Warm and dry Psychiatric: Normal mood and affect  The right knee is examined and reveals no swelling, ecchymosis, erythema, or deformity. The patients range of motion is from 0-130 degrees passively. However, patient sits comfortably at +10 degrees of extension. There is no crepitus felt. The patient has no tenderness to palpation in the medial or lateral joint lines. There is no patellar facet tenderness. The patient has 5/5 strength to resisted hip flexion, VMO isolation, knee flexion and extension. The patient is stable to anterior and posterior draw. The patient is stable to Lochman testing. There is no valgus or varus ligamentous instability. Negative Garry and Grind tests. There is no pain with patellar mobility or apprehension testing. The calf and thigh are soft, supple, and nontender. The patient is grossly neurovascularly intact distally. [de-identified] : No new imaging.

## 2024-10-09 ENCOUNTER — RX RENEWAL (OUTPATIENT)
Age: 29
End: 2024-10-09

## 2024-11-05 ENCOUNTER — APPOINTMENT (OUTPATIENT)
Dept: ORTHOPEDIC SURGERY | Facility: CLINIC | Age: 29
End: 2024-11-05
Payer: COMMERCIAL

## 2024-11-05 DIAGNOSIS — S83.281D OTHER TEAR OF LATERAL MENISCUS, CURRENT INJURY, RIGHT KNEE, SUBSEQUENT ENCOUNTER: ICD-10-CM

## 2024-11-05 PROCEDURE — 99214 OFFICE O/P EST MOD 30 MIN: CPT

## 2024-11-13 NOTE — ASSESSMENT
[FreeTextEntry1] : #Right ankle pain and swelling, following sprain injury\par Nonhealing as per patient, despite PT sessions\par will review images of repeat MRI December \par #Chronic low back pain, scoliosis\par Continue with physical therapy\par #Chronic history of GI discomfort /abdominal bloating\par s.p EGD/biopsy unrevealing \par to follow with GI as indicated\par obtain CBC. CMP and inflammatory markers today\par \par will call with results\par \par \par  (4) no impairment

## 2024-12-17 ENCOUNTER — APPOINTMENT (OUTPATIENT)
Dept: ORTHOPEDIC SURGERY | Facility: CLINIC | Age: 29
End: 2024-12-17
Payer: COMMERCIAL

## 2024-12-17 DIAGNOSIS — S76.312A STRAIN OF MUSCLE, FASCIA AND TENDON OF THE POSTERIOR MUSCLE GROUP AT THIGH LEVEL, LEFT THIGH, INITIAL ENCOUNTER: ICD-10-CM

## 2024-12-17 DIAGNOSIS — S83.281D OTHER TEAR OF LATERAL MENISCUS, CURRENT INJURY, RIGHT KNEE, SUBSEQUENT ENCOUNTER: ICD-10-CM

## 2024-12-17 PROCEDURE — 99214 OFFICE O/P EST MOD 30 MIN: CPT

## 2025-01-02 NOTE — ED PROVIDER NOTE - NSDCPRINTRESULTS_ED_ALL_ED
weakness
[place]
Patient requests all Lab, Cardiology, and Radiology Results on their Discharge Instructions

## 2025-01-13 ENCOUNTER — RX RENEWAL (OUTPATIENT)
Age: 30
End: 2025-01-13

## 2025-02-06 NOTE — ADDENDUM
Informed patient samples were available at the [facility]   [FreeTextEntry1] : I, Myron Lawrence, acted solely as a scribe for Dr. Michael Caicedo on this date 01/14/2021 .\par All medical record entries made by the Scribe were at my, Dr. Michael Caicedo, direction and personally dictated by me on 01/14/2021 . I have reviewed the chart and agree that the record accurately reflects my personal performance of the history, physical exam, assessment and plan. I have also personally directed, reviewed, and agreed with the chart.  39 weeks gestation of pregnancy

## 2025-04-21 ENCOUNTER — RX RENEWAL (OUTPATIENT)
Age: 30
End: 2025-04-21

## 2025-05-15 NOTE — ED STATDOCS - NSCAREINITIATED _GEN_ER
In an effort to ensure that our patients [employer], a Team Member has reviewed your chart and identified an opportunity to provide the best care possible. An attempt was made to discuss or schedule due or overdue Preventive or Chronic Condition care.Care Gaps identified: Diabetes A1c Testing.    The Outcome was Contact was not made, left message. We are attempting to schedule a lab appointment. If you have any questions or need help with scheduling, contact our Health Outreach Team at [phone].   Type of Appointment needed: Diabetes A1c Testing  
Karis Chau(Attending)